# Patient Record
Sex: FEMALE | Race: WHITE | Employment: OTHER | URBAN - METROPOLITAN AREA
[De-identification: names, ages, dates, MRNs, and addresses within clinical notes are randomized per-mention and may not be internally consistent; named-entity substitution may affect disease eponyms.]

---

## 2018-08-31 ENCOUNTER — HOSPITAL ENCOUNTER (OUTPATIENT)
Dept: OTHER | Age: 68
Discharge: HOME | End: 2018-09-07
Attending: PHYSICAL MEDICINE & REHABILITATION | Admitting: PHYSICAL MEDICINE & REHABILITATION

## 2018-08-31 ENCOUNTER — HOSPITAL ENCOUNTER (OUTPATIENT)
Dept: PHYSICAL THERAPY | Age: 68
Discharge: HOME | End: 2018-08-31
Admitting: PHYSICAL MEDICINE & REHABILITATION

## 2018-08-31 NOTE — PROGRESS NOTES
impaired, limited or restricted  Changing and Maintaining Body Position Goal Status (): At least 1 percent but less than 20 percent impaired, limited or restricted    OutComes Score  Neck Disability Index Raw Score: 12                                        Goals  Short term goals  Time Frame for Short term goals: 3 weeks  Short term goal 1: Be independent with home exer  Short term goal 2: Decrease pain 25-50%  Long term goals  Time Frame for Long term goals : 6 weeks  Long term goal 1: Decrease pain 50-75%  Long term goal 2: Iprove cervical ROM to be able to maintain good posture  Patient Goals   Patient goals : \"Stop hurting\"          Casper Goldberg [id number]     .

## 2018-09-01 ENCOUNTER — HOSPITAL ENCOUNTER (OUTPATIENT)
Dept: OTHER | Age: 68
Discharge: HOME | End: 2018-09-01
Attending: PHYSICAL MEDICINE & REHABILITATION | Admitting: PHYSICAL MEDICINE & REHABILITATION
Payer: MEDICARE

## 2018-09-04 ENCOUNTER — HOSPITAL ENCOUNTER (OUTPATIENT)
Dept: PHYSICAL THERAPY | Age: 68
Discharge: HOME | End: 2018-09-05
Admitting: PHYSICAL MEDICINE & REHABILITATION

## 2018-09-04 NOTE — FLOWSHEET NOTE
Physical Therapy Aquatic Flow Sheet   Date:  2018    Patient Name:  Palak Baker    :  1950    Restrictions/Precautions:    Pertinent Medical History:  Back pain, RA, Parkinson, Scoliosis,    Diagnosis:  Cervical Spondylosis  Treatment Diagnosis:      Insurance/Certification information: medicare  Referring Physician:  Dr Shae Posada of care signed (Y/N):      Visit# / total visits:    Pain level: 7/10       G-Code (if applicable):      Date G-Code Applied:        Progress Note: []  Yes  [x]  No  Next due by: Visit #10:      Subjective:  My left shoulder hurts more than my neck. I don't have any neck pain. Objective:  Observation:  See eval  Test measurements:      Key  B= Belt DB= Dumbells T= Theratube   G=Gloves N= Noodles W= Weights   P= Paddles WW=Water Walker K= Kickboard        Transfers:   steps with bilat handrails      % Immersion: 75%            Ambulation:   Exercises UE:      Forwards 3+2 with UE circles Shoulder Shrugs 10    Lateral 1 with UE ab/ad Shoulder circles 10    Marching    Scapular Retraction  10    Retro   Rolling  10    Cariocas  Push Downs 10     IR/ER 10     Punching    Stretching:  Rowing 10   Gastroc/Soleus   Elbow Flex/Ext 10   Hamstring   Shldr Flex/Ext     Knee flex stretch   Shldr Abd/Add    Piriformis   Horiz Abd/Add     Hip flexor    Arm Circles  10 x 2   SKTC   PNF Diagonals    DKTC  UE oscillations f/b, s/s    ITB   Wall Push-ups    Quad  Figure 8's    Mid back  20 sec x 2 Buoy pull/push downs    UT  Tband rows    Rhom  Tband lats    Post Shoulder  Lumbar Rot w/ paddles    Pec   Small ball pull downs                   diagonals             Cervical:       AROM Flex 10      AROM Extension     LEs exercises:   AROM Side Bending    Marching   AROM Rotation 10 R/L   Heel Raises   Chin tucks    Toe Raises   Chin nods     Squats        Hamstring Curls        Hip Flexion   Balance:      Hip Abduction  SLS    Hip Circles  Tandem stance    TA set [address]

## 2018-09-06 ENCOUNTER — HOSPITAL ENCOUNTER (OUTPATIENT)
Dept: PHYSICAL THERAPY | Age: 68
Discharge: HOME | End: 2018-09-07
Admitting: PHYSICAL MEDICINE & REHABILITATION

## 2018-09-06 NOTE — FLOWSHEET NOTE
Tandem stance    TA set  NBOS eyes open    Glut Set  NBOS eyes closed    Hip Extension  Hand to Opposite Knee    Hip Adduction    Box Step     Hip IR   Noodle Stance     Hip ER  Stop/Go Gait    Fig 8's  Switch Gait                Seated:  Functional:    Ankle Pumps  Step up forward    Ankle circles  Step up lateral    Knee flex & ext  Step down    Hip Abd & Adduction  [place] squats    Bicycle   Crate Lifts    Add Set with ball  Lunges forward    LX stab with med ball throws  Lunges lateral    Ankle INV  Lunges retro    Ankle EV  Lower ab curl with noodle      Upper ab curl with ball      Med ball straight lifts      Med ball diagonal lifts      Hydrorider          Noodle:      Leg Press  Deep Water:    Noodle hang at wall  Jog    Noodle hang deep water  Jumping Jacks    Noodle Bicycle  Heel to toe      Hand to opposite knee      Cross country skier      Rocking Horse    Verbal cues for proper posture, exercise technique and exercise without increase pain. Timed Code Treatment Minutes:  25    Total Treatment Minutes:  25    Treatment/Activity Tolerance:  [x] Patient tolerated treatment well [] Patient limited by fatique  [] Patient limited by pain  [] Patient limited by other medical complications  [] Other:     Pain after aquatics: 5-6/10    Patient Education: Continued to suggest to pt watch posture when sitting regardless of which chair at home. Pt verbalized understanding and stated she is getting a new chair in 6-8 weeks. Plan:   [x] Continue per plan of care [] Alter current plan (see comments)  [] Plan of care initiated [] Hold pending MD visit [] Discharge    Plan for Next Session:  Gradually increase gait and exercise with an emphasis on posture, proper exercise tech, and no increase pain. Therapist will educate patient on lumbopelvic stabilization with exercise and ADL's. Add:  Inc lateral gt, small ball push downs as tolerated.     Electronically signed by: Mariposa Moon PTA

## 2018-09-25 ENCOUNTER — HOSPITAL ENCOUNTER (OUTPATIENT)
Dept: PHYSICAL THERAPY | Age: 68
Setting detail: THERAPIES SERIES
Discharge: HOME | End: 2018-09-25
Payer: MEDICARE

## 2018-09-25 PROCEDURE — 97530 THERAPEUTIC ACTIVITIES: CPT | Performed by: CHIROPRACTOR

## 2018-09-25 PROCEDURE — G8982 BODY POS GOAL STATUS: HCPCS | Performed by: CHIROPRACTOR

## 2018-09-25 PROCEDURE — 97113 AQUATIC THERAPY/EXERCISES: CPT

## 2018-09-25 PROCEDURE — G8983 BODY POS D/C STATUS: HCPCS | Performed by: CHIROPRACTOR

## 2018-09-25 PROCEDURE — 97150 GROUP THERAPEUTIC PROCEDURES: CPT

## 2018-09-25 NOTE — FLOWSHEET NOTE
the following for HEP:   Patient verbalized/demonstrated understanding and was issued written handout.     Manual Treatments:      Modalities:      Timed Code Treatment Minutes:  25    Total Treatment Minutes: 25     Treatment/Activity Tolerance:  [x] Patient tolerated treatment well [] Patient limited by fatigue  [] Patient limited by pain  [] Patient limited by other medical complications  [] Other:     Prognosis: [x] Good [] Fair  [] Poor    Goals:    Short term goals  Time Frame for Short term goals: 3 weeks  Short term goal 1: Be independent with home exer  Short term goal 2: Decrease pain 25-50%      Long term goals  Time Frame for Long term goals : 6 weeks  Long term goal 1: Decrease pain 50-75%  Long term goal 2: Improve cervical ROM to be able to maintain good posture     Patient Requires Follow-up: [] Yes  [x] No    Plan:   [] Continue per plan of care [] Alter current plan (see comments)  [] Plan of care initiated [] Hold pending MD visit [x] Discharge    Plan for Next Session:  DC to HEP    Electronically signed by:  Roseanne Woodruff PT #[id number]

## 2018-09-25 NOTE — DISCHARGE SUMMARY
Physical Therapy Discharge Note  Date: 2018    Patient Name: Nicky Carrion  : 1950  MRN: [id number]    Diagnosis:  Cervical Spondylosis    Referring Physician: Dr. Kodi Dickerson  Dates of Service: 18 - 18  Total # of Visits:  7    Medicare Total:     $933.15      G-code (if applicable):    Date G-code Applied:  PT G-Codes  Functional Assessment Tool Used: NDI  Score: 23  Functional Limitation: Changing and maintaining body position  Changing and Maintaining Body Position Goal Status (): At least 1 percent but less than 20 percent impaired, limited or restricted  Changing and Maintaining Body Position Discharge Status (): At least 40 percent but less than 60 percent impaired, limited or restricted    Treatment to Date:  [] Therapeutic Exercise  [] Modalities:  [x] Therapeutic Activity     [] Ultrasound  [] Electrical Stim   [] Gait Training      [] Cervical Traction    [] Lumbar Traction  [] Neuromuscular Re-education  [] Cold/hotpack [] Iontophoresis  [] Instruction in HEP      Other:  [] Manual Therapy       []    [x] Aquatic Therapy       []                    ? Significant Findings At Last Visit:       No improvement of pain or ROM            Goal Status:  [] Achieved [] Partially Achieved  [x] Not Achieved     Reason for Discharge:  [] Goals Met   [] Patient did not return after initial evaluation   [] Progress Plateaued [] Missed _____ scheduled appointments   [] No insurance coverage [x] Patient terminated therapy   [] Other:         PT recommendation:   [x] Patient now discharged with HEP      [] Other:    Discharge discussed with:  [x] Patient  [] Caregiver      [] Other        Electronically signed by:  Elizabeth Bradley #[id number]    If you have any questions or concerns, please don't hesitate to call.   Thank you for your referral.

## 2019-03-25 ENCOUNTER — HOSPITAL ENCOUNTER (OUTPATIENT)
Dept: PHYSICAL THERAPY | Age: 69
Setting detail: THERAPIES SERIES
Discharge: HOME | End: 2019-03-25
Payer: MEDICARE

## 2019-03-25 NOTE — PROGRESS NOTES
Physical Therapy  Cancellation/No-show Note  Patient Name:  Jones Thrasher  :  1950   Date:  3/25/2019  Cancelled visits to date: 1  No-shows to date: 0    For today's appointment patient:  [x]  Cancelled - initial evaluation   []  Rescheduled appointment  []  No-show     Reason given by patient:  []  Patient ill  []  Conflicting appointment  []  No transportation    []  Conflict with work  []  No reason given  [x]  Other:     Comments: injured foot      Electronically signed by:  Guy Russo, [id number] [address]

## 2019-03-26 ENCOUNTER — APPOINTMENT (OUTPATIENT)
Dept: PHYSICAL THERAPY | Age: 69
End: 2019-03-26
Payer: MEDICARE

## 2019-03-28 ENCOUNTER — HOSPITAL ENCOUNTER (OUTPATIENT)
Dept: PHYSICAL THERAPY | Age: 69
Setting detail: THERAPIES SERIES
Discharge: HOME | End: 2019-03-28
Payer: MEDICARE

## 2019-03-28 ENCOUNTER — APPOINTMENT (OUTPATIENT)
Dept: PHYSICAL THERAPY | Age: 69
End: 2019-03-28
Payer: MEDICARE

## 2019-03-28 PROCEDURE — 97530 THERAPEUTIC ACTIVITIES: CPT | Performed by: CHIROPRACTOR

## 2019-03-28 PROCEDURE — 97161 PT EVAL LOW COMPLEX 20 MIN: CPT | Performed by: CHIROPRACTOR

## 2019-04-02 ENCOUNTER — APPOINTMENT (OUTPATIENT)
Dept: PHYSICAL THERAPY | Age: 69
End: 2019-04-02
Payer: MEDICARE

## 2019-04-02 ENCOUNTER — HOSPITAL ENCOUNTER (OUTPATIENT)
Dept: PHYSICAL THERAPY | Age: 69
Setting detail: THERAPIES SERIES
Discharge: HOME | End: 2019-04-02
Payer: MEDICARE

## 2019-04-02 PROCEDURE — 97113 AQUATIC THERAPY/EXERCISES: CPT

## 2019-04-02 NOTE — FLOWSHEET NOTE
Curls  10      Hip Flexion  10 Balance: Hip Abduction  10 SLS    Hip Circles  10 Tandem stance    TA set   NBOS eyes open    Glut Set  10 NBOS eyes closed    Hip Extension   Hand to Opposite Knee    Hip Adduction    Box Step     Hip IR   Noodle Stance     Hip ER  Stop/Go Gait    Fig 8's  Switch Gait                Seated:  Functional:    Ankle Pumps  10 Step up forward    Ankle circles  10 Step up lateral    Knee flex & ext  10 Step down    Hip Abd & Adduction  10 [place] squats    Bicycle   10 Crate Lifts    Add Set with ball  Lunges forward    LX stab with med ball throws  Lunges lateral    Ankle INV  Lunges retro    Ankle EV  Lower ab curl with noodle      Upper ab curl with ball      Med ball straight lifts      Med ball diagonal lifts      Hydrorider          Noodle:      Leg Press  Deep Water:    Noodle hang at wall  Jog    Noodle hang deep water  Jumping Jacks    Noodle Bicycle  Heel to toe      Hand to opposite knee      Cross country skier      Rocking Horse        Timed Code Treatment Minutes:  30    Total Treatment Minutes:  30    Treatment/Activity Tolerance:  [x] Patient tolerated treatment well [] Patient limited by fatique  [] Patient limited by pain  [] Patient limited by other medical complications  [] Other:     Prognosis: [] Good [] Fair  [] Poor    Patient Requires Follow-up: [] Yes  [] No    Plan:   [x] Continue per plan of care [] Alter current plan (see comments)  [] Plan of care initiated [] Hold pending MD visit [] Discharge    Plan for Next Session:  With emphasis on stabilization, good posture and exercise technique, gradually progress spinal stabilization exercises to increase strength, flexibility and endurance and to decrease pain and improve function.   Add: Assess and progress as tolerated      Electronically signed by: Bella Loza PTA

## 2019-04-04 ENCOUNTER — APPOINTMENT (OUTPATIENT)
Dept: PHYSICAL THERAPY | Age: 69
End: 2019-04-04
Payer: MEDICARE

## 2019-04-09 ENCOUNTER — APPOINTMENT (OUTPATIENT)
Dept: PHYSICAL THERAPY | Age: 69
End: 2019-04-09
Payer: MEDICARE

## 2019-04-09 ENCOUNTER — HOSPITAL ENCOUNTER (OUTPATIENT)
Dept: PHYSICAL THERAPY | Age: 69
Setting detail: THERAPIES SERIES
Discharge: HOME | End: 2019-04-09
Payer: MEDICARE

## 2019-04-09 PROCEDURE — 97150 GROUP THERAPEUTIC PROCEDURES: CPT

## 2019-04-09 PROCEDURE — 97113 AQUATIC THERAPY/EXERCISES: CPT

## 2019-04-09 NOTE — FLOWSHEET NOTE
Physical Therapy Aquatic Flow Sheet   Date:  2019    Patient Name:  Francesco Koenig    :  1950    Restrictions/Precautions:    Pertinent Medical History:     Diagnosis:  Cervical stenosis, adhesive capsulitis, chronic musculoskeletal pain  Treatment Diagnosis:      Insurance/Certification information: Medicare  Referring Physician: Dr Anai Roberts of care signed (Y/N):      Visit# / total visits:  3/12  Pain level: 4/10 Left shoulder and 4/10 LB      Progress Note: []  Yes  [x]  No  Next due by: Visit #10:      History of Injury:  States she had similar problem in the past which improved, but then began increasing a few months ago    Subjective:  My back is also hurting. No increase pain after initial aquatics.   Objective:  Observation:  See eval  Test measurements:      Key  B= Belt DB= Dumbells T= Theratube   G=Gloves N= Noodles W= Weights   P= Paddles WW=Water Walker K= Kickboard     *[facility] use pool at [facility]*   Transfers:   steps  (ind)      % Immersion:  75            Ambulation:  Laps Exercises UE:      Forwards  3 +1  Shoulder Shrugs     Lateral 1 Shoulder circles  10    Marching    Scapular Retraction  10    Retro   Rolling  10    Cariocas  Push Downs  10     IR/ER  10     Punching     Stretching: bilat Rowing  10   Gastroc/Soleus   Elbow Flex/Ext  10   Hamstring  20 sec x 3 Shldr Flex/Ext     Knee flex stretch   Shldr Abd/Add    Piriformis   Horiz Abd/Add     Hip flexor    Arm Circles  10 x 2   SKTC   PNF Diagonals    DKTC  UE oscillations f/b, s/s    ITB   Wall Push-ups    Quad  Figure 8's    Mid back   Buoy pull/push downs    UT  Tband rows    Rhom  Tband lats    Post Shoulder  Lumbar Rot w/ paddles    Pec   Small ball pull downs                   diagonals             Cervical:       AROM Flex       AROM Extension     LEs exercises:   AROM Side Bending    Marching  10 AROM Rotation    Heel Raises  HOLD Chin tucks    Toe Raises  HOLD Chin nods     Squats  10 Hamstring Curls  10      Hip Flexion  10 Balance: Hip Abduction  10 SLS    Hip Circles  10 Tandem stance    TA set   NBOS eyes open    Glut Set  10 NBOS eyes closed    Hip Extension   Hand to Opposite Knee    Hip Adduction    Box Step     Hip IR   Noodle Stance     Hip ER  Stop/Go Gait    Fig 8's  Switch Gait                Seated:  Functional:    Ankle Pumps  15 Step up forward    Ankle circles  10 Step up lateral    Knee flex & ext  15 Step down    Hip Abd & Adduction  15 [place] squats    Bicycle   15 Crate Lifts    Add Set with ball 10 Lunges forward    LX stab with med ball throws  Lunges lateral    Ankle INV  Lunges retro    Ankle EV  Lower ab curl with noodle      Upper ab curl with ball      Med ball straight lifts      Med ball diagonal lifts      Foot onto/off of step alt 10         Noodle:      Leg Press  Deep Water:    Noodle hang at wall  Jog    Noodle hang deep water  Jumping Jacks    Noodle Bicycle  Heel to toe      Hand to opposite knee      Cross country skier      Rocking Horse    Verbal cues for proper posture, exercise technique and exercise without increase pain. Timed Code Treatment Minutes:  35  Aquatic group therapy is the presence of more than 1 patient in the pool, at the same time. During that time each patient receives individualized instruction designed for their specific diagnosis. Total Treatment Minutes:  35    Treatment/Activity Tolerance:  [x] Patient tolerated treatment well [] Patient limited by fatique  [] Patient limited by pain  [] Patient limited by other medical complications  [] Other:     Pain after aquatics:   same    Patient Education:  Discussed with pt importance of exercises without increase pain and with proper posture. Pt verbalized understanding.     Plan:   [x] Continue per plan of care [] Alter current plan (see comments)  [] Plan of care initiated [] Hold pending MD visit [] Discharge    Plan for Next Session:    Add: inc forward gt x 4, inc lateral gt x 2, inc seated exer x 20, leg press as tolerated.       Electronically signed by: Ayah Coley, PTA  [id number]

## 2019-04-11 ENCOUNTER — APPOINTMENT (OUTPATIENT)
Dept: PHYSICAL THERAPY | Age: 69
End: 2019-04-11
Payer: MEDICARE

## 2019-04-11 ENCOUNTER — HOSPITAL ENCOUNTER (OUTPATIENT)
Dept: PHYSICAL THERAPY | Age: 69
Setting detail: THERAPIES SERIES
Discharge: HOME | End: 2019-04-11
Payer: MEDICARE

## 2019-04-11 PROCEDURE — 97150 GROUP THERAPEUTIC PROCEDURES: CPT

## 2019-04-11 PROCEDURE — 97113 AQUATIC THERAPY/EXERCISES: CPT

## 2019-04-11 NOTE — FLOWSHEET NOTE
Physical Therapy Aquatic Flow Sheet   Date:  2019    Patient Name:  Arden Palencia    :  1950    Restrictions/Precautions:    Pertinent Medical History:     Diagnosis:  Cervical stenosis, adhesive capsulitis, chronic musculoskeletal pain  Treatment Diagnosis:      Insurance/Certification information: Medicare  Referring Physician: Dr Ki Vega of care signed (Y/N):      Visit# / total visits:    Pain level: 4/10 Left shoulder and 5/10 LB, no neck pain      Progress Note: []  Yes  [x]  No  Next due by: Visit #10:      History of Injury:  States she had similar problem in the past which improved, but then began increasing a few months ago    Subjective:  No more pain with the pool. I do expect my back to hurt more later.   Objective:  Observation:  See eval  Test measurements:      Key  B= Belt DB= Dumbells T= Theratube   G=Gloves N= Noodles W= Weights   P= Paddles WW=Water Walker K= Kickboard     *Can use pool at [facility]*   Transfers:   steps  (ind)      % Immersion:  75            Ambulation:  Laps Exercises UE:      Forwards  4 +1  Shoulder Shrugs     Lateral 2 Shoulder circles  10    Marching    Scapular Retraction  10    Retro   Rolling  10    Cariocas  Push Downs  10     IR/ER  10     Punching  10   Stretching: bilat Rowing  10   Gastroc/Soleus   Elbow Flex/Ext  10   Hamstring  20 sec x 3 Shldr Flex/Ext  10   Knee flex stretch   Shldr Abd/Add 10   Piriformis   Horiz Abd/Add     Hip flexor    Arm Circles  10 x 2   SKTC   PNF Diagonals    DKTC  UE oscillations f/b, s/s    ITB   Wall Push-ups    Quad  Figure 8's    Mid back   Buoy pull/push downs    UT  Tband rows    Rhom  Tband lats    Post Shoulder  Lumbar Rot w/ paddles    Pec   Small ball pull downs                   diagonals             Cervical:       AROM Flex       AROM Extension     LEs exercises:   AROM Side Bending    Marching  10 AROM Rotation    Heel Raises  HOLD Chin tucks    Toe Raises  HOLD Chin nods Squats  10      Hamstring Curls  10      Hip Flexion  10 Balance: Hip Abduction  10 SLS    Hip Circles  10 Tandem stance    TA set   NBOS eyes open    Glut Set  10 NBOS eyes closed    Hip Extension   Hand to Opposite Knee    Hip Adduction    Box Step     Hip IR   Noodle Stance     Hip ER  Stop/Go Gait    Fig 8's  Switch Gait                Seated:  Functional:    Ankle Pumps  20 Step up forward    Ankle circles  10 Step up lateral    Knee flex & ext  20 Step down    Hip Abd & Adduction  20 [place] squats    Bicycle   20 Crate Lifts    Add Set with ball 10 Lunges forward    LX stab with med ball throws  Lunges lateral    Ankle INV  Lunges retro    Ankle EV  Lower ab curl with noodle      Upper ab curl with ball      Med ball straight lifts      Med ball diagonal lifts      Foot onto/off of step alt 15         Noodle:      Leg Press 10 R/L Deep Water:    Noodle hang at wall  Jog    Noodle hang deep water  Jumping Jacks    Noodle Bicycle  Heel to toe      Hand to opposite knee      Cross country skier      Rocking Horse    Verbal cues for proper posture, exercise technique and exercise without increase pain. Timed Code Treatment Minutes:  35  Aquatic group therapy is the presence of more than 1 patient in the pool, at the same time. During that time each patient receives individualized instruction designed for their specific diagnosis. Total Treatment Minutes:  35    Treatment/Activity Tolerance:  [x] Patient tolerated treatment well [] Patient limited by fatique  [] Patient limited by pain  [] Patient limited by other medical complications  [] Other:     Pain after aquatics:   5.5/10 LB, shld 4/10    Patient Education:  Discussed with pt in length use of ice to specific spot on left low back that pt c/o of. Suggested pt using ice @10-15 min several times today, especially before the pain gets worse. Pt verbalized understanding.   Plan:   [x] Continue per plan of care [] Alter current plan (see comments)  [] Plan of care initiated [] Hold pending MD visit [] Discharge    Plan for Next Session:    Add: inc seated exer x 30, leg press x 15 each as tolerated. Assess use of ice.       Electronically signed by: Zhanna Bowman, PTA  [id number]

## 2019-04-16 ENCOUNTER — HOSPITAL ENCOUNTER (OUTPATIENT)
Dept: PHYSICAL THERAPY | Age: 69
Setting detail: THERAPIES SERIES
Discharge: HOME | End: 2019-04-16
Payer: MEDICARE

## 2019-04-16 ENCOUNTER — APPOINTMENT (OUTPATIENT)
Dept: PHYSICAL THERAPY | Age: 69
End: 2019-04-16
Payer: MEDICARE

## 2019-04-16 PROCEDURE — 97113 AQUATIC THERAPY/EXERCISES: CPT

## 2019-04-16 PROCEDURE — 97150 GROUP THERAPEUTIC PROCEDURES: CPT

## 2019-04-16 NOTE — FLOWSHEET NOTE
Physical Therapy Aquatic Flow Sheet   Date:  2019    Patient Name:  Cole Arzola    :  1950    Restrictions/Precautions:    Pertinent Medical History:     Diagnosis:  Cervical stenosis, adhesive capsulitis, chronic musculoskeletal pain  Treatment Diagnosis:      Insurance/Certification information: Medicare  Referring Physician: Dr Elisabet Nunez of care signed (Y/N):      Visit# / total visits:    Pain level: 5/10 LB     Progress Note: []  Yes  [x]  No  Next due by: Visit #10:      History of Injury:  States she had similar problem in the past which improved, but then began increasing a few months ago    Subjective: My whole body is stiff today. My left part of my back is painful (pt pointing to left superior lumbar spine). My left shoulder isn't hurting.      Objective:  Observation:  See eval  Test measurements:      Key  B= Belt DB= Dumbells T= Theratube   G=Gloves N= Noodles W= Weights   P= Paddles WW=Water Walker K= Kickboard     *Can use pool at [facility]/[employer]*   Transfers:   steps  (ind)      % Immersion:  75            Ambulation:  Laps Exercises UE:      Forwards  4 +1  Shoulder Shrugs     Lateral 2 Shoulder circles  10    Marching    Scapular Retraction  10    Retro   Rolling  10    Cariocas  Push Downs  10     IR/ER  10     Punching  10   Stretching: bilat Rowing  10   Gastroc/Soleus   Elbow Flex/Ext  10   Hamstring  20 sec x 3 Shldr Flex/Ext  10   Knee flex stretch   Shldr Abd/Add 10   Piriformis   Horiz Abd/Add     Hip flexor    Arm Circles  10 x 2   SKTC   PNF Diagonals    DKTC  UE oscillations f/b, s/s    ITB   Wall Push-ups    Quad  Figure 8's    Mid back   Buoy pull/push downs    UT  Tband rows    Rhom  Tband lats    Post Shoulder  Lumbar Rot w/ paddles    Pec   Small ball pull downs                   diagonals             Cervical:       AROM Flex 5      AROM Extension     LEs exercises:   AROM Side Bending    Marching  10 AROM Rotation 5R/ 5L   Heel Raises  HOLD Chin tucks    Toe Raises  HOLD Chin nods     Squats  10      Hamstring Curls  10      Hip Flexion  10 Balance: Hip Abduction  10 SLS    Hip Circles  10 Tandem stance    TA set   NBOS eyes open    Glut Set  10 NBOS eyes closed    Hip Extension   Hand to Opposite Knee    Hip Adduction    Box Step     Hip IR   Noodle Stance     Hip ER  Stop/Go Gait    Fig 8's  Switch Gait                Seated:  Functional:    Ankle Pumps  30 Step up forward    Ankle circles  10 Step up lateral    Knee flex & ext  30 Step down    Hip Abd & Adduction  30 [place] squats    Bicycle   30 Crate Lifts    Add Set with ball 10 Lunges forward    LX stab with med ball throws  Lunges lateral    Ankle INV  Lunges retro    Ankle EV  Lower ab curl with noodle      Upper ab curl with ball      Med ball straight lifts      Med ball diagonal lifts      Foot onto/off of step alt 20         Noodle:      Leg Press 15 R/L Deep Water:    Noodle hang at wall 5 min with dec pain noted Jog    Noodle hang deep water  Jumping Jacks    Noodle Bicycle  Heel to toe      Hand to opposite knee      Cross country skier      Rocking Horse    Verbal cues for proper posture, exercise technique and exercise without increase pain. Timed Code Treatment Minutes:  45  Aquatic group therapy is the presence of more than 1 patient in the pool, at the same time. During that time each patient receives individualized instruction designed for their specific diagnosis.       Total Treatment Minutes:  45    Treatment/Activity Tolerance:  [x] Patient tolerated treatment well [] Patient limited by fatique  [] Patient limited by pain  [] Patient limited by other medical complications  [] Other:     Pain after aquatics:   4-5/10 LB,     Patient Education:      Plan:   [x] Continue per plan of care [] Alter current plan (see comments)  [] Plan of care initiated [] Hold pending MD visit [] Discharge    Plan for Next Session:    Add: leg press x 20 each, continue with [employer] for dec pain as tolerated.         Electronically signed by: Aristides Brown, PTA  [id number]

## 2019-04-18 ENCOUNTER — APPOINTMENT (OUTPATIENT)
Dept: PHYSICAL THERAPY | Age: 69
End: 2019-04-18
Payer: MEDICARE

## 2019-04-23 ENCOUNTER — APPOINTMENT (OUTPATIENT)
Dept: PHYSICAL THERAPY | Age: 69
End: 2019-04-23
Payer: MEDICARE

## 2019-04-23 ENCOUNTER — HOSPITAL ENCOUNTER (OUTPATIENT)
Dept: PHYSICAL THERAPY | Age: 69
Setting detail: THERAPIES SERIES
Discharge: HOME | End: 2019-04-23
Payer: MEDICARE

## 2019-04-23 PROCEDURE — 97150 GROUP THERAPEUTIC PROCEDURES: CPT

## 2019-04-23 PROCEDURE — 97113 AQUATIC THERAPY/EXERCISES: CPT

## 2019-04-23 NOTE — FLOWSHEET NOTE
Physical Therapy Aquatic Flow Sheet   Date:  2019    Patient Name:  Cole Arzola    :  1950    Restrictions/Precautions:    Pertinent Medical History:     Diagnosis:  Cervical stenosis, adhesive capsulitis, chronic musculoskeletal pain  Treatment Diagnosis:      Insurance/Certification information: Medicare  Referring Physician: Dr Elisabet Nunez of care signed (Y/N):      Visit# / total visits:    Pain level: 3/10 LB, 5/10 left shoulder     Progress Note: []  Yes  [x]  No  Next due by: Visit #10:      History of Injury:  States she had similar problem in the past which improved, but then began increasing a few months ago    Subjective:  I am going to see my doctor next week to see what is going on with my back.      Objective:  Observation:  See eval  Test measurements:      Key  B= Belt DB= Dumbells T= Theratube   G=Gloves N= Noodles W= Weights   P= Paddles WW=Water Walker K= Kickboard     *Can use pool at [facility]*   Transfers:   steps  (ind)      % Immersion:  75            Ambulation:  Laps Exercises UE:      Forwards  4 +1  Shoulder Shrugs     Lateral 2 Shoulder circles  10    Marching    Scapular Retraction  10    Retro   Rolling  10    Cariocas  Push Downs  10     IR/ER  10     Punching  10   Stretching: bilat Rowing  10   Gastroc/Soleus   Elbow Flex/Ext  10   Hamstring  20 sec x 3 Shldr Flex/Ext  10   Knee flex stretch   Shldr Abd/Add 10   Piriformis   Horiz Abd/Add  10   Hip flexor    Arm Circles  10 x 2   SKTC   PNF Diagonals    DKTC  UE oscillations f/b, s/s    ITB   Wall Push-ups    Quad  Figure 8's    Mid back   Buoy pull/push downs    UT  Tband rows    Rhom  Tband lats    Post Shoulder  Lumbar Rot w/ paddles    Pec   Small ball pull downs                   diagonals             Cervical:       AROM Flex 5      AROM Extension     LEs exercises:   AROM Side Bending    Marching  10 AROM Rotation 5R/ 5L   Heel Raises  HOLD Chin tucks    Toe Raises  HOLD Chin nods Squats  10      Hamstring Curls  10      Hip Flexion  10 Balance: Hip Abduction  10 SLS    Hip Circles  10 Tandem stance    TA set   NBOS eyes open    Glut Set  10 NBOS eyes closed    Hip Extension   Hand to Opposite Knee    Hip Adduction    Box Step     Hip IR   Noodle Stance     Hip ER  Stop/Go Gait    Fig 8's  Switch Gait                Seated:  Functional:    Ankle Pumps  30 Step up forward    Ankle circles  10 Step up lateral    Knee flex & ext  30 Step down    Hip Abd & Adduction  30 [place] squats    Bicycle   30 Crate Lifts    Add Set with ball 10 Lunges forward    LX stab with med ball throws  Lunges lateral    Ankle INV  Lunges retro    Ankle EV  Lower ab curl with noodle      Upper ab curl with ball      Med ball straight lifts      Med ball diagonal lifts      Foot onto/off of step alt 20         Noodle:      Leg Press 20 R/L Deep Water:    Noodle hang at wall  Jog    Noodle hang deep water  Jumping Jacks    Noodle Bicycle  Heel to toe      Hand to opposite knee      Cross country skier      Rocking Horse    Verbal cues for proper posture, exercise technique and exercise without increase pain. Group Treatment Minutes:  20  Aquatic group therapy is the presence of more than 1 patient in the pool, at the same time. During that time each patient receives individualized instruction designed for their specific diagnosis. Total Individual Treatment Minutes:  20    Treatment/Activity Tolerance:  [x] Patient tolerated treatment well [] Patient limited by fatique  [] Patient limited by pain  [] Patient limited by other medical complications  [] Other:     Pain after aquatics:   5/10 LB and left shoulder    Patient Education:      Plan:   [x] Continue per plan of care [] Alter current plan (see comments)  [] Plan of care initiated [] Hold pending MD visit [] Discharge    Plan for Next Session:    Add: box step as tolerated.         Electronically signed by: Kelly Mohr PTA [address]

## 2019-04-25 ENCOUNTER — HOSPITAL ENCOUNTER (OUTPATIENT)
Dept: PHYSICAL THERAPY | Age: 69
Setting detail: THERAPIES SERIES
Discharge: HOME | End: 2019-04-25
Payer: MEDICARE

## 2019-04-25 ENCOUNTER — APPOINTMENT (OUTPATIENT)
Dept: PHYSICAL THERAPY | Age: 69
End: 2019-04-25
Payer: MEDICARE

## 2019-04-25 PROCEDURE — 97530 THERAPEUTIC ACTIVITIES: CPT | Performed by: CHIROPRACTOR

## 2019-04-25 PROCEDURE — 97150 GROUP THERAPEUTIC PROCEDURES: CPT

## 2019-04-25 PROCEDURE — 97113 AQUATIC THERAPY/EXERCISES: CPT

## 2019-04-25 NOTE — FLOWSHEET NOTE
Physical Therapy Aquatic Flow Sheet   Date:  2019    Patient Name:  Simran Mahan    :  1950    Restrictions/Precautions:    Pertinent Medical History:     Diagnosis:  Cervical stenosis, adhesive capsulitis, chronic musculoskeletal pain  Treatment Diagnosis:      Insurance/Certification information: Medicare  Referring Physician: Dr Mo Combs of care signed (Y/N):      Visit# / total visits:    Pain level: 3/10 LB, 4/10 left shoulder     Progress Note: []  Yes  [x]  No  Next due by: Visit #10:      History of Injury:  States she had similar problem in the past which improved, but then began increasing a few months ago    Subjective:  I need to make my appt with the back doctor.    Objective:  Observation:  See eval  Test measurements:      Key  B= Belt DB= Dumbells T= Theratube   G=Gloves N= Noodles W= Weights   P= Paddles WW=Water Walker K= Kickboard     *Can use pool at [facility]*   Transfers:   steps  (ind)      % Immersion:  75            Ambulation:  Laps Exercises UE:      Forwards  4 +1  Shoulder Shrugs     Lateral 2 Shoulder circles  10    Marching    Scapular Retraction  10    Retro   Rolling  10    Cariocas  Push Downs  10     IR/ER  10     Punching  10   Stretching: bilat Rowing  10   Gastroc/Soleus   Elbow Flex/Ext  10   Hamstring  20 sec x 3 Shldr Flex/Ext  10   Knee flex stretch   Shldr Abd/Add 10   Piriformis   Horiz Abd/Add  10   Hip flexor    Arm Circles  10 x 2   SKTC   PNF Diagonals    DKTC  UE oscillations f/b, s/s    ITB   Wall Push-ups    Quad  Figure 8's    Mid back   Buoy pull/push downs    UT  Tband rows    Rhom  Tband lats    Post Shoulder  Lumbar Rot w/ paddles    Pec   Small ball pull downs                   diagonals             Cervical:       AROM Flex 5      AROM Extension     LEs exercises:   AROM Side Bending    Marching  10 AROM Rotation 5R/ 5L   Heel Raises  HOLD Chin tucks    Toe Raises  HOLD Chin nods     Squats  10      Hamstring Curls 10      Hip Flexion  10 Balance: Hip Abduction  10 SLS    Hip Circles  10 Tandem stance    TA set   NBOS eyes open    Glut Set  10 NBOS eyes closed    Hip Extension   Hand to Opposite Knee    Hip Adduction    Box Step  3 R/L   Hip IR   Noodle Stance     Hip ER  Stop/Go Gait    Fig 8's  Switch Gait                Seated:  Functional:    Ankle Pumps  30 Step up forward    Ankle circles  10 Step up lateral    Knee flex & ext  30 Step down    Hip Abd & Adduction  30 [place] squats    Bicycle   30 Crate Lifts    Add Set with ball 10 Lunges forward    LX stab with med ball throws  Lunges lateral    Ankle INV  Lunges retro    Ankle EV  Lower ab curl with noodle      Upper ab curl with ball      Med ball straight lifts      Med ball diagonal lifts      Foot onto/off of step alt 20         Noodle:      Leg Press 20 R/L Deep Water:    Noodle hang at wall  Jog    Noodle hang deep water  Jumping Jacks    Noodle Bicycle  Heel to toe      Hand to opposite knee      Cross country skier      Rocking Horse    Verbal cues for proper posture, exercise technique and exercise without increase pain. Group Treatment Minutes:  20  Aquatic group therapy is the presence of more than 1 patient in the pool, at the same time. During that time each patient receives individualized instruction designed for their specific diagnosis.       Total Individual Treatment Minutes:  20    Treatment/Activity Tolerance:  [x] Patient tolerated treatment well [] Patient limited by fatique  [] Patient limited by pain  [] Patient limited by other medical complications  [] Other:     Pain after aquatics:   Same after aquatics    Patient Education:      Plan:   [] Continue per plan of care [] Alter current plan (see comments)  [] Plan of care initiated [] Hold pending MD visit [x] Discharge    Plan for Next Session:    D/C per PT plan        Electronically signed by: Pascual Sosa, PTA  [id number]

## 2019-04-25 NOTE — DISCHARGE SUMMARY
Physical Therapy Discharge Note  Date: 2019    Patient Name: Jayleen Henderson  : 1950  MRN: [id number]    Diagnosis:  Cervical Stenosis, Adhesive Capsulitis, Chronic Musculoskeletal pain    Referring Physician: Dr. Vito Sales    Dates of Service:  3/28/19 - 19  Total # of Visits:   7    Medicare Cap Total for 2019:  $638.85    G-code (if applicable):    Date G-code Applied:  PT G-Codes  Functional Assessment Tool Used: UEFS  Score: 46.25    Treatment to Date:  [] Therapeutic Exercise  [] Modalities:  [x] Therapeutic Activity     [] Ultrasound  [] Electrical Stim   [] Gait Training      [] Cervical Traction    [] Lumbar Traction  [] Neuromuscular Re-education  [] Cold/hotpack [] Iontophoresis  [] Instruction in HEP      Other:  [] Manual Therapy       []    [x] Aquatic Therapy       []                    ? Significant Findings At Last Visit:       Pain: Shoulder 4-5/10, LB 4/10       Cervical mobility decreased 25% in Rot and flex, 50% in B SB, and 75% in ex           Goal Status:  [] Achieved [x] Partially Achieved  [] Not Achieved     Reason for Discharge:  [] Goals Met   [] Patient did not return after initial evaluation   [] Progress Plateaued [] Missed _____ scheduled appointments   [] No insurance coverage [x] Patient terminated therapy   [] Other:        PT recommendation:   [x] Patient now discharged with HEP      [] Other:    Discharge discussed with:  [x] Patient  [] Caregiver      [x] Other  --Will cont exer at [facility]        Electronically signed by:  Serafin Ty #[id number]    If you have any questions or concerns, please don't hesitate to call.   Thank you for your referral.

## 2019-04-25 NOTE — FLOWSHEET NOTE
Physical Therapy Daily Treatment Note  Date:  2019    Patient Name:  Arlice Krabbe    :  1950  MRN: [id number]  Restrictions/Precautions:    Pertinent Medical History:  Medical/Treatment Diagnosis Information:  · Diagnosis: Cervical Stenosis, Adhesive Capsulitis, Chronic Musculoskeletal pain     Insurance/Certification information:   Medicare  Physician Information:    Dr. Jazmin Naidu of care signed (Y/N): Faxed  Visit# / total visits:    Pain level:   Shoulder 4-/10, LB 4/10     G-Code (if applicable):      Date / Visit # G-Code Applied:  /       Progress Note: []  Yes  []  No  Next due by: Visit #10      History of Injury: States she had similar problem in the past which improved, but then began increasing  a few months                                ago    Subjective:   States it feels good while she is in the pool, but not having any significant carryover    Objective:  Observation:   Test measurements:   L Cervical Rotation decreased to 25% deficit    Exercises:  Exercise/Equipment Resistance/Repetitions Other comments   Re-eval     Reviewed home exer/activities, posture                                                                    Other Therapeutic Activities:      Home Exercise Program:  Voiced understanding of home activities    Manual Treatments:      Modalities:      Timed Code Treatment Minutes:  25    Total Treatment Minutes:  25    Assessment  [x] Patient tolerated treatment well [] Patient limited by fatigue  [] Patient limited by pain  [] Patient limited by other medical complications  [] Other:         Prognosis: [x] Good [] Fair  [] Poor    Patient Requires Follow-up: [] Yes  [x] No    Plan:   [x] Continue per plan of care [] Alter current plan (see comments)  [] Plan of care initiated [] Hold pending MD visit [] Discharge  Plan for Next Session: DC to HEP.  Member of [employer]    Electronically signed by:  Chadwick Doss MR#[id number]

## 2019-04-30 ENCOUNTER — APPOINTMENT (OUTPATIENT)
Dept: PHYSICAL THERAPY | Age: 69
End: 2019-04-30
Payer: MEDICARE

## 2019-07-30 ENCOUNTER — HOSPITAL ENCOUNTER (OUTPATIENT)
Dept: PHYSICAL THERAPY | Age: 69
Setting detail: THERAPIES SERIES
Discharge: HOME | End: 2019-07-30
Payer: MEDICARE

## 2019-07-30 PROCEDURE — 97530 THERAPEUTIC ACTIVITIES: CPT | Performed by: CHIROPRACTOR

## 2019-07-30 PROCEDURE — G0283 ELEC STIM OTHER THAN WOUND: HCPCS | Performed by: CHIROPRACTOR

## 2019-07-30 PROCEDURE — 97161 PT EVAL LOW COMPLEX 20 MIN: CPT | Performed by: CHIROPRACTOR

## 2019-07-30 ASSESSMENT — PAIN SCALES - QUEBEC BACK PAIN DISABILITY SCALE
LIFT AND CARRY A HEAVY SUITCASE: 5
PUT ON SOCKS OR PANYHOSE: 5
QUEBEC DISABILITY INDEX: 60-79%
WALK A FEW BLOCKS OR 300 TO 400M: 4
RUN ONE BLOCK OR 100M: 5
TOTAL SCORE: 76
STAND UP FOR 20 TO 30 MINUTES: 4
CLIMB ONE FLIGHT OF STAIRS: 4
RIDE IN A CAR: 3
BEND OVER TO CLEAN THE BATHTUB: 5
REACH UP TO HIGH SHELVES: 5
WALK SEVERAL KILOMETERS  OR MILES: 5
SLEEP THROUGH THE NIGHT: 1
MAKE YOUR BED: 3
PULL OR PUSH HEAVY DOORS: 5
TAKE FOOD OUT OF THE REFRIGERATOR: 3
SIT IN A CHAIR FOR SEVERAL HOURS: 5
GET OUT OF BED: 1
THROW A BALL: 2
TURN OVER IN BED: 2
CARRY TWO BAGS OF GROCERIES: 5
MOVE A CHAIR: 4
QUEBEC CMS MODIFIER: CL

## 2019-07-30 NOTE — PLAN OF CARE
Outpatient Physical Therapy  [x] [facility]    Phone: [phone]   Fax: [phone]   [] [facility]  Phone: [phone]              Fax: [phone]  [] [facility]   Phone: [phone]   Fax: [phone]     To: Referring Practitioner: Geena Medina CNP      Patient: Patrice Lea   : 1950   MRN: [id number]  Evaluation Date: 2019      Diagnosis Information:  · Diagnosis: Lumbar Arthritis,Lumbar Stenosis, Cervical Spondylolisthesis, Parkinsons   ·       Physical Therapy Certification/Re-Certification Form  Dear Geena Medina CNP  The following patient has been evaluated for physical therapy services and for therapy to continue, Medicare requires monthly physician review of the treatment plan. Please review the attached evaluation and/or summary of the patient's plan of care, and verify that you agree therapy should continue by signing the attached document and sending it back to our office. Plan of Care/Treatment to date:  [x] Therapeutic Exercise    [x] Modalities:  [x] Therapeutic Activity     [] Ultrasound  [] Electrical Stimulation  [] Gait Training      [] Cervical Traction [] Lumbar Traction  [] Neuromuscular Re-education    [] Cold/hotpack [] Iontophoresis   [x] Instruction in HEP     Other:  [] Manual Therapy      []             [] Aquatic Therapy      []           ? Frequency/Duration:  # Days per week: [] 1 day # Weeks: [] 1 week [] 5 weeks     [] 2 days? [] 2 weeks [x] 6 weeks     [] 3 days   [] 3 weeks [] 7 weeks     [] 4 days   [] 4 weeks [x] 8 weeks    Rehab Potential: [] Excellent [x] Good [] Fair  [] Poor       Electronically signed by:  Madalyn Lundborg [id number]      If you have any questions or concerns, please don't hesitate to call.   Thank you for your referral.      Physician Signature:________________________________Date:__________________  By signing above, therapists plan is approved by physician

## 2019-07-30 NOTE — PROGRESS NOTES
Modalities, ROM       OutComes Score  [place] Total Score: 76 (07/30/19 8607)                                                      Goals  Short term goals  Time Frame for Short term goals: 3 weeks  Short term goal 1: Be independent with home exer  Short term goal 2: Decrease pain 25-50%  Long term goals  Time Frame for Long term goals : 6 weeks  Long term goal 1: Decrease pain 50-75%  Long term goal 2: Improve trunk mobility to be better able to maintain good posture  Patient Goals   Patient goals : \"Back pain Relief\"        Lorrie Gómez MS#[id number]

## 2019-08-01 ENCOUNTER — APPOINTMENT (OUTPATIENT)
Dept: PHYSICAL THERAPY | Age: 69
End: 2019-08-01
Payer: MEDICARE

## 2019-08-06 ENCOUNTER — HOSPITAL ENCOUNTER (OUTPATIENT)
Dept: PHYSICAL THERAPY | Age: 69
Setting detail: THERAPIES SERIES
Discharge: HOME | End: 2019-08-06
Payer: MEDICARE

## 2019-08-06 PROCEDURE — 97110 THERAPEUTIC EXERCISES: CPT | Performed by: CHIROPRACTOR

## 2019-08-06 PROCEDURE — G0283 ELEC STIM OTHER THAN WOUND: HCPCS | Performed by: CHIROPRACTOR

## 2019-08-08 ENCOUNTER — HOSPITAL ENCOUNTER (OUTPATIENT)
Dept: PHYSICAL THERAPY | Age: 69
Setting detail: THERAPIES SERIES
Discharge: HOME | End: 2019-08-08
Payer: MEDICARE

## 2019-08-08 PROCEDURE — G0283 ELEC STIM OTHER THAN WOUND: HCPCS | Performed by: CHIROPRACTOR

## 2019-08-08 PROCEDURE — 97110 THERAPEUTIC EXERCISES: CPT | Performed by: CHIROPRACTOR

## 2019-08-08 PROCEDURE — 97035 APP MDLTY 1+ULTRASOUND EA 15: CPT | Performed by: CHIROPRACTOR

## 2019-08-13 ENCOUNTER — HOSPITAL ENCOUNTER (OUTPATIENT)
Dept: PHYSICAL THERAPY | Age: 69
Setting detail: THERAPIES SERIES
Discharge: HOME | End: 2019-08-13
Payer: MEDICARE

## 2019-08-13 PROCEDURE — G0283 ELEC STIM OTHER THAN WOUND: HCPCS | Performed by: CHIROPRACTOR

## 2019-08-13 PROCEDURE — 97110 THERAPEUTIC EXERCISES: CPT | Performed by: CHIROPRACTOR

## 2019-08-13 PROCEDURE — 97035 APP MDLTY 1+ULTRASOUND EA 15: CPT | Performed by: CHIROPRACTOR

## 2019-08-15 ENCOUNTER — HOSPITAL ENCOUNTER (OUTPATIENT)
Dept: PHYSICAL THERAPY | Age: 69
Setting detail: THERAPIES SERIES
Discharge: HOME | End: 2019-08-15
Payer: MEDICARE

## 2019-08-15 PROCEDURE — 97110 THERAPEUTIC EXERCISES: CPT | Performed by: CHIROPRACTOR

## 2019-08-15 PROCEDURE — G0283 ELEC STIM OTHER THAN WOUND: HCPCS | Performed by: CHIROPRACTOR

## 2019-08-20 ENCOUNTER — HOSPITAL ENCOUNTER (OUTPATIENT)
Dept: PHYSICAL THERAPY | Age: 69
Setting detail: THERAPIES SERIES
Discharge: HOME | End: 2019-08-20
Payer: MEDICARE

## 2019-08-20 PROCEDURE — 97110 THERAPEUTIC EXERCISES: CPT | Performed by: CHIROPRACTOR

## 2019-08-20 PROCEDURE — G0283 ELEC STIM OTHER THAN WOUND: HCPCS | Performed by: CHIROPRACTOR

## 2019-08-22 ENCOUNTER — HOSPITAL ENCOUNTER (OUTPATIENT)
Dept: PHYSICAL THERAPY | Age: 69
Setting detail: THERAPIES SERIES
Discharge: HOME | End: 2019-08-22
Payer: MEDICARE

## 2019-08-22 PROCEDURE — G0283 ELEC STIM OTHER THAN WOUND: HCPCS | Performed by: CHIROPRACTOR

## 2019-08-22 PROCEDURE — 97110 THERAPEUTIC EXERCISES: CPT | Performed by: CHIROPRACTOR

## 2019-08-22 NOTE — FLOWSHEET NOTE
Physical Therapy Daily Treatment Note  Date:  2019    Patient Name:  Omari Martinez    :  1950  MRN: [id number]  Restrictions/Precautions:    Pertinent Medical History:  Medical/Treatment Diagnosis Information:  · Diagnosis: Lumbar Arthritis,Lumbar Stenosis, Cervical Spondylolisthesis, Parkinsons     Insurance/Certification information:   Medicare  Physician Information:  Referring Practitioner: Erasmo Laguerre CNP  Plan of care signed (Y/N):   Faxed  Visit# / total visits:    Pain level: 7/10      History of Injury:  States LBP began ~ 4 months ago for no apparent reason    Subjective:   Attributes increased pain to increased activity (going to the store)    Objective:  Observation:   Test measurements:      Exercises:  Exercise/Equipment Resistance/Repetitions Other comments        Reviewed home exer, posture and body mechanics          Nu-step                #5 X 5 min    GSS/HSS 30 sec x 2    Stand abd 2# - 2x10      R/L         Seated reclines 2x10          LTR x10    SLR 1# - 2x10    R/L     Bridges    2x10              Gentle B LE Tx 20 sec x 6                 E-stim    (sit) IFC with MH x 15 min                Other Therapeutic Activities:      Home Exercise Program:  Voiced understanding of home exer    Manual Treatments:      Modalities:  E-stim, MH    Timed Code Treatment Minutes:  30    Total Treatment Minutes:  45    Assessment  [x] Patient tolerated treatment well [] Patient limited by fatigue  [] Patient limited by pain  [] Patient limited by other medical complications  [] Other:       Prognosis: [x] Good [] Fair  [] Poor    Patient Requires Follow-up: [x] Yes  [] No    Plan:   [x] Continue per plan of care [] Alter current plan (see comments)  [] Plan of care initiated [] Hold pending MD visit [] Discharge  Plan for Next Session:  Increase exer as tolerated    Electronically signed by:  Lynne DUGAN#[id number]

## 2019-08-27 ENCOUNTER — HOSPITAL ENCOUNTER (OUTPATIENT)
Dept: PHYSICAL THERAPY | Age: 69
Setting detail: THERAPIES SERIES
Discharge: HOME | End: 2019-08-27
Payer: MEDICARE

## 2019-08-27 PROCEDURE — 97110 THERAPEUTIC EXERCISES: CPT | Performed by: CHIROPRACTOR

## 2019-08-27 NOTE — FLOWSHEET NOTE
Physical Therapy Daily Treatment Note  Date:  2019    Patient Name:  Radha Cason    :  1950  MRN: [id number]  Restrictions/Precautions:    Pertinent Medical History:  Medical/Treatment Diagnosis Information:  · Diagnosis: Lumbar Arthritis,Lumbar Stenosis, Cervical Spondylolisthesis, Parkinsons     Insurance/Certification information:   Medicare  Physician Information:  Referring Practitioner: Feliz Barrios CNP  Plan of care signed (Y/N):   Faxed  Visit# / total visits:    Pain level: 0/10      History of Injury:  States LBP began ~ 4 months ago for no apparent reason    Subjective:   Reports feeling good today    Objective:  Observation:   Test measurements:      Exercises:  Exercise/Equipment Resistance/Repetitions Other comments        Reviewed home exer, posture and body mechanics          Nu-step                #5 X 5 min    GSS/HSS 30 sec x 2    Stand abd 2# - 2x10      R/L         Seated reclines 2x10          LTR x10    SLR 1# - 2x10    R/L     Bridges    2x10              Gentle B LE Tx 20 sec x 6                 E-stim    (sit)  Trial without               Other Therapeutic Activities:      Home Exercise Program:  Voiced understanding of home exer    Manual Treatments:      Modalities:  E-stim, MH    Timed Code Treatment Minutes:  30    Total Treatment Minutes: 30    Assessment  [x] Patient tolerated treatment well [] Patient limited by fatigue  [] Patient limited by pain  [] Patient limited by other medical complications  [] Other:       Prognosis: [x] Good [] Fair  [] Poor    Patient Requires Follow-up: [x] Yes  [] No    Plan:   [x] Continue per plan of care [] Alter current plan (see comments)  [] Plan of care initiated [] Hold pending MD visit [] Discharge  Plan for Next Session:  Increase exer as tolerated    Electronically signed by:  Daniele BEE#[id number]

## 2019-08-29 ENCOUNTER — HOSPITAL ENCOUNTER (OUTPATIENT)
Dept: PHYSICAL THERAPY | Age: 69
Setting detail: THERAPIES SERIES
Discharge: HOME | End: 2019-08-29
Payer: MEDICARE

## 2019-08-29 PROCEDURE — 97110 THERAPEUTIC EXERCISES: CPT | Performed by: CHIROPRACTOR

## 2019-09-03 ENCOUNTER — HOSPITAL ENCOUNTER (OUTPATIENT)
Dept: PHYSICAL THERAPY | Age: 69
Setting detail: THERAPIES SERIES
Discharge: HOME | End: 2019-09-03
Payer: MEDICARE

## 2019-09-03 PROCEDURE — 97110 THERAPEUTIC EXERCISES: CPT | Performed by: CHIROPRACTOR

## 2019-09-05 ENCOUNTER — APPOINTMENT (OUTPATIENT)
Dept: PHYSICAL THERAPY | Age: 69
End: 2019-09-05
Payer: MEDICARE

## 2019-09-06 ENCOUNTER — HOSPITAL ENCOUNTER (OUTPATIENT)
Dept: PHYSICAL THERAPY | Age: 69
Setting detail: THERAPIES SERIES
Discharge: HOME | End: 2019-09-06
Payer: MEDICARE

## 2019-09-06 PROCEDURE — 97110 THERAPEUTIC EXERCISES: CPT | Performed by: CHIROPRACTOR

## 2019-09-06 NOTE — FLOWSHEET NOTE
Physical Therapy Daily Treatment Note  Date:  2019    Patient Name:  Lorenza Luque    :  1950  MRN: [id number]  Restrictions/Precautions:    Pertinent Medical History:  Medical/Treatment Diagnosis Information:  · Diagnosis: Lumbar Arthritis,Lumbar Stenosis, Cervical Spondylolisthesis, Parkinsons     Insurance/Certification information:   Medicare  Physician Information:  Referring Practitioner: Gregorio Aleman CNP  Plan of care signed (Y/N):   Faxed  Visit# / total visits:    Pain level: 0/10      History of Injury:  States LBP began ~ 4 months ago for no apparent reason    Subjective:  No complaints voiced    Objective:  Observation:   Test measurements:      Exercises:  Exercise/Equipment Resistance/Repetitions Other comments        Reviewed home exer, posture and body mechanics          Nu-step                #5 X 5 min    /HSS 30 sec x 2    Stand abd 2# - 2x10      R/L         Seated reclines 2x10          LTR x10    SLR 1# - 2x10    R/L     Bridges    2x10              Gentle B LE Tx 20 sec x 6                 E-stim    (sit)  Pt declined               Other Therapeutic Activities:      Home Exercise Program:  Voiced understanding of home exer    Manual Treatments:      Modalities:  E-stim, MH    Timed Code Treatment Minutes:  30    Total Treatment Minutes: 30    Assessment  [x] Patient tolerated treatment well [] Patient limited by fatigue  [] Patient limited by pain  [] Patient limited by other medical complications  [] Other:       Prognosis: [x] Good [] Fair  [] Poor    Patient Requires Follow-up: [x] Yes  [] No    Plan:   [x] Continue per plan of care [] Alter current plan (see comments)  [] Plan of care initiated [] Hold pending MD visit [] Discharge  Plan for Next Session: Probable DC after next appt    Electronically signed by:  Henri CASTRO#[id number]

## 2019-09-10 ENCOUNTER — HOSPITAL ENCOUNTER (OUTPATIENT)
Dept: PHYSICAL THERAPY | Age: 69
Setting detail: THERAPIES SERIES
Discharge: HOME | End: 2019-09-10
Payer: MEDICARE

## 2019-09-10 PROCEDURE — 97110 THERAPEUTIC EXERCISES: CPT | Performed by: CHIROPRACTOR

## 2019-09-10 ASSESSMENT — PAIN SCALES - QUEBEC BACK PAIN DISABILITY SCALE
WALK A FEW BLOCKS OR 300 TO 400M: 4
MOVE A CHAIR: 4
SIT IN A CHAIR FOR SEVERAL HOURS: 5
MAKE YOUR BED: 3
STAND UP FOR 20 TO 30 MINUTES: 3
TOTAL SCORE: 68
TURN OVER IN BED: 2
THROW A BALL: 2
LIFT AND CARRY A HEAVY SUITCASE: 5
RUN ONE BLOCK OR 100M: 5
QUEBEC CMS MODIFIER: CL
GET OUT OF BED: 0
SLEEP THROUGH THE NIGHT: 1
CLIMB ONE FLIGHT OF STAIRS: 3
CARRY TWO BAGS OF GROCERIES: 5
REACH UP TO HIGH SHELVES: 5
WALK SEVERAL KILOMETERS  OR MILES: 5
QUEBEC DISABILITY INDEX: 60-79%
PUT ON SOCKS OR PANYHOSE: 4
TAKE FOOD OUT OF THE REFRIGERATOR: 1
RIDE IN A CAR: 2
BEND OVER TO CLEAN THE BATHTUB: 4
PULL OR PUSH HEAVY DOORS: 5

## 2020-02-25 RX ORDER — CALCIUM CARBONATE 500(1250)
500 TABLET ORAL DAILY
COMMUNITY

## 2020-02-25 RX ORDER — FOLIC ACID 1 MG/1
1 TABLET ORAL DAILY
COMMUNITY

## 2020-02-25 RX ORDER — CARBIDOPA AND LEVODOPA 50; 200 MG/1; MG/1
1 TABLET, EXTENDED RELEASE ORAL NIGHTLY
COMMUNITY

## 2020-02-25 RX ORDER — AMANTADINE HYDROCHLORIDE 100 MG/1
100 CAPSULE, GELATIN COATED ORAL DAILY
COMMUNITY

## 2020-02-25 RX ORDER — DULOXETIN HYDROCHLORIDE 30 MG/1
30 CAPSULE, DELAYED RELEASE ORAL DAILY
COMMUNITY
End: 2020-05-21

## 2020-02-25 RX ORDER — INFLIXIMAB 100 MG/10ML
5 INJECTION, POWDER, LYOPHILIZED, FOR SOLUTION INTRAVENOUS SEE ADMIN INSTRUCTIONS
COMMUNITY

## 2020-02-25 NOTE — PROGRESS NOTES
[address] [facility] time___0920_________        Surgery time___1050_________    Take the following medications with a sip of water: Rytary    Do not eat or drink anything after 12:00 midnight prior to your surgery. This includes water chewing gum, mints and ice chips. You may brush your teeth and gargle the morning of your surgery, but do not swallow the water     Please see your family doctor/pediatrician for a history and physical and/or concerning medications. H&P 2/26/20 Dr. Donahue Pel any test results/reports from your physicians office. If you are under the care of a heart doctor or specialist doctor, please be aware that you may be asked to them for clearance    You may be asked to stop blood thinners such as Coumadin, Plavix, Fragmin, Lovenox, etc., or any anti-inflammatories such as:  Aspirin, Ibuprofen, Advil, Naproxen prior to your surgery. We also ask that you stop any OTC medications such as fish oil, vitamin E, glucosamine, garlic, Multivitamins, COQ 10, etc.    We ask that you do not smoke 24 hours prior to surgery  We ask that you do not  drink any alcoholic beverages 24 hours prior to surgery     You must make arrangements for a responsible adult to take you home after your surgery. For your safety you will not be allowed to leave alone or drive yourself home. Your surgery will be cancelled if you do not have a ride home. Also for your safety, it is strongly suggested that someone stay with you the first 24 hours after your surgery. A parent or legal guardian must accompany a child scheduled for surgery and plan to stay at the hospital until the child is discharged. Please do not bring other children with you. For your comfort, please wear simple loose fitting clothing to the hospital.  Please do not bring valuables. Wear short sleeve button down shirt or loose fitting shirt. Bring eye drops or ointment.    Do not wear any make-up or nail polish on your fingers or toes      For your safety, please do not wear any jewelry or body piercing's on the day of surgery. All jewelry must be removed. If you have dentures, they will be removed before going to operating room. For your convenience, we will provide you with a container. If you wear contact lenses or glasses, they will be removed, please bring a case for them. If you have a living will and a durable power of  for healthcare, please bring in a copy. As part of our patient safety program to minimize surgical site infections, we ask you to do the following:    · Please notify your surgeon if you develop any illness between         now and the  day of your surgery. · This includes a cough, cold, fever, sore throat, nausea,         or vomiting, and diarrhea, etc.  ·  Please notify your surgeon if you experience dizziness, shortness         of breath or blurred vision between now and the time of your surgery. Do not shave your operative site 96 hours prior to surgery. For face and neck surgery, men may use an electric razor 48 hours   prior to surgery. You may shower the night before surgery or the morning of   your surgery with an antibacterial soap. You will need to bring a photo ID and insurance card    [facility] has an onsite pharmacy, would you like to utilize our pharmacy     If you will be staying overnight and use a C-pap machine, please bring   your C-pap to hospital     Our goal is to provide you with excellent care, therefore, visitors will be limited to two(2) in the room at a time so that we may focus on providing this care for you. Please contact pre-admission testing if you have any further questions. [facility] phone number:  [phone]  Please note these are generalized instructions for all surgical cases, you may be provided with more specific instructions according to your surgery.

## 2020-03-02 ENCOUNTER — PREP FOR PROCEDURE (OUTPATIENT)
Dept: OPHTHALMOLOGY | Age: 70
End: 2020-03-02

## 2020-03-02 ENCOUNTER — ANESTHESIA EVENT (OUTPATIENT)
Dept: SURGERY | Age: 70
End: 2020-03-02
Payer: MEDICARE

## 2020-03-02 RX ORDER — CYCLOPENTOLATE HYDROCHLORIDE 10 MG/ML
1 SOLUTION/ DROPS OPHTHALMIC SEE ADMIN INSTRUCTIONS
Status: CANCELLED | OUTPATIENT
Start: 2020-03-02

## 2020-03-02 RX ORDER — CIPROFLOXACIN HYDROCHLORIDE 3.5 MG/ML
1 SOLUTION/ DROPS TOPICAL SEE ADMIN INSTRUCTIONS
Status: CANCELLED | OUTPATIENT
Start: 2020-03-02

## 2020-03-02 RX ORDER — SODIUM CHLORIDE 0.9 % (FLUSH) 0.9 %
10 SYRINGE (ML) INJECTION PRN
Status: CANCELLED | OUTPATIENT
Start: 2020-03-02

## 2020-03-02 RX ORDER — SODIUM CHLORIDE 0.9 % (FLUSH) 0.9 %
10 SYRINGE (ML) INJECTION EVERY 12 HOURS SCHEDULED
Status: CANCELLED | OUTPATIENT
Start: 2020-03-02

## 2020-03-02 RX ORDER — TETRACAINE HYDROCHLORIDE 5 MG/ML
1 SOLUTION OPHTHALMIC SEE ADMIN INSTRUCTIONS
Status: CANCELLED | OUTPATIENT
Start: 2020-03-02

## 2020-03-02 RX ORDER — PHENYLEPHRINE HYDROCHLORIDE 25 MG/ML
1 SOLUTION/ DROPS OPHTHALMIC SEE ADMIN INSTRUCTIONS
Status: CANCELLED | OUTPATIENT
Start: 2020-03-02

## 2020-03-02 NOTE — PROGRESS NOTES
[address]  [phone]        Pre-Op Phone Call:     Patient Name: Carlos Alberto Love     Telephone Information:   Mobile [phone]     Home phone:  [phone]    Surgery Time:   10:50 AM     Arrival Time:  0920     Left extended Message:  NA     Message left with:     Recent change in health status:  No     Advised of transportation/ policy:  Yes     NPO policy reviewed:  Yes PT     Advised to take morning heart/blood pressure medications with sips of water morning of surgery? Yes     Instructed to bring eye drops, photo identification, and insurance card day of surgery? Yes     Advised to wear short sleeved button down shirt (no T-shirt underneath):  Yes     Advised not to wear jewelry, hairpins, or pantyhose day of surgery? Yes     Advised not to wear make-up and to wash face day of surgery?   Yes    Remarks:        Electronically signed by:  Azael Hyatt RN at 3/2/2020 2:17 PM

## 2020-03-03 ENCOUNTER — ANESTHESIA (OUTPATIENT)
Dept: SURGERY | Age: 70
End: 2020-03-03
Payer: MEDICARE

## 2020-03-03 ENCOUNTER — HOSPITAL ENCOUNTER (OUTPATIENT)
Age: 70
Setting detail: OUTPATIENT SURGERY
Discharge: HOME | End: 2020-03-03
Attending: OPHTHALMOLOGY | Admitting: OPHTHALMOLOGY
Payer: MEDICARE

## 2020-03-03 VITALS — SYSTOLIC BLOOD PRESSURE: 118 MMHG | DIASTOLIC BLOOD PRESSURE: 67 MMHG | OXYGEN SATURATION: 100 %

## 2020-03-03 VITALS
OXYGEN SATURATION: 96 % | BODY MASS INDEX: 20.24 KG/M2 | HEART RATE: 92 BPM | WEIGHT: 110 LBS | SYSTOLIC BLOOD PRESSURE: 121 MMHG | HEIGHT: 62 IN | TEMPERATURE: 97.8 F | DIASTOLIC BLOOD PRESSURE: 58 MMHG | RESPIRATION RATE: 16 BRPM

## 2020-03-03 PROCEDURE — 6370000000 HC RX 637 (ALT 250 FOR IP): Performed by: OPHTHALMOLOGY

## 2020-03-03 PROCEDURE — 6360000002 HC RX W HCPCS: Performed by: OPHTHALMOLOGY

## 2020-03-03 PROCEDURE — 3700000000 HC ANESTHESIA ATTENDED CARE: Performed by: OPHTHALMOLOGY

## 2020-03-03 PROCEDURE — 7100000010 HC PHASE II RECOVERY - FIRST 15 MIN: Performed by: OPHTHALMOLOGY

## 2020-03-03 PROCEDURE — 3600000014 HC SURGERY LEVEL 4 ADDTL 15MIN: Performed by: OPHTHALMOLOGY

## 2020-03-03 PROCEDURE — 3700000001 HC ADD 15 MINUTES (ANESTHESIA): Performed by: OPHTHALMOLOGY

## 2020-03-03 PROCEDURE — 3600000004 HC SURGERY LEVEL 4 BASE: Performed by: OPHTHALMOLOGY

## 2020-03-03 PROCEDURE — 2709999900 HC NON-CHARGEABLE SUPPLY: Performed by: OPHTHALMOLOGY

## 2020-03-03 PROCEDURE — 2580000003 HC RX 258: Performed by: ANESTHESIOLOGY

## 2020-03-03 PROCEDURE — 6370000000 HC RX 637 (ALT 250 FOR IP)

## 2020-03-03 PROCEDURE — 6360000002 HC RX W HCPCS: Performed by: NURSE ANESTHETIST, CERTIFIED REGISTERED

## 2020-03-03 PROCEDURE — 2720000010 HC SURG SUPPLY STERILE: Performed by: OPHTHALMOLOGY

## 2020-03-03 PROCEDURE — 7100000011 HC PHASE II RECOVERY - ADDTL 15 MIN: Performed by: OPHTHALMOLOGY

## 2020-03-03 PROCEDURE — 2500000003 HC RX 250 WO HCPCS: Performed by: OPHTHALMOLOGY

## 2020-03-03 PROCEDURE — V2632 POST CHMBR INTRAOCULAR LENS: HCPCS | Performed by: OPHTHALMOLOGY

## 2020-03-03 DEVICE — LENS INTRAOCULAR 1 PC 22+ DIOPT 6X12.5 MM POST CHMBR FLD: Type: IMPLANTABLE DEVICE | Status: FUNCTIONAL

## 2020-03-03 RX ORDER — CYCLOPENTOLATE HYDROCHLORIDE 10 MG/ML
1 SOLUTION/ DROPS OPHTHALMIC SEE ADMIN INSTRUCTIONS
Status: DISCONTINUED | OUTPATIENT
Start: 2020-03-03 | End: 2020-03-03 | Stop reason: HOSPADM

## 2020-03-03 RX ORDER — SODIUM CHLORIDE 9 MG/ML
INJECTION, SOLUTION INTRAVENOUS CONTINUOUS
Status: DISCONTINUED | OUTPATIENT
Start: 2020-03-03 | End: 2020-03-03 | Stop reason: HOSPADM

## 2020-03-03 RX ORDER — SODIUM CHLORIDE 0.9 % (FLUSH) 0.9 %
10 SYRINGE (ML) INJECTION EVERY 12 HOURS SCHEDULED
Status: DISCONTINUED | OUTPATIENT
Start: 2020-03-03 | End: 2020-03-03 | Stop reason: HOSPADM

## 2020-03-03 RX ORDER — CIPROFLOXACIN HYDROCHLORIDE 3.5 MG/ML
1 SOLUTION/ DROPS TOPICAL
Status: COMPLETED | OUTPATIENT
Start: 2020-03-03 | End: 2020-03-03

## 2020-03-03 RX ORDER — TETRACAINE HYDROCHLORIDE 5 MG/ML
SOLUTION OPHTHALMIC
Status: COMPLETED
Start: 2020-03-03 | End: 2020-03-03

## 2020-03-03 RX ORDER — SODIUM CHLORIDE 0.9 % (FLUSH) 0.9 %
10 SYRINGE (ML) INJECTION EVERY 12 HOURS SCHEDULED
Status: DISCONTINUED | OUTPATIENT
Start: 2020-03-03 | End: 2020-03-03 | Stop reason: SDUPTHER

## 2020-03-03 RX ORDER — PHENYLEPHRINE HCL 2.5 %
1 DROPS OPHTHALMIC (EYE) SEE ADMIN INSTRUCTIONS
Status: DISCONTINUED | OUTPATIENT
Start: 2020-03-03 | End: 2020-03-03 | Stop reason: HOSPADM

## 2020-03-03 RX ORDER — MIDAZOLAM HYDROCHLORIDE 1 MG/ML
INJECTION INTRAMUSCULAR; INTRAVENOUS PRN
Status: DISCONTINUED | OUTPATIENT
Start: 2020-03-03 | End: 2020-03-03 | Stop reason: SDUPTHER

## 2020-03-03 RX ORDER — SODIUM CHLORIDE 0.9 % (FLUSH) 0.9 %
10 SYRINGE (ML) INJECTION PRN
Status: DISCONTINUED | OUTPATIENT
Start: 2020-03-03 | End: 2020-03-03 | Stop reason: HOSPADM

## 2020-03-03 RX ORDER — SODIUM CHLORIDE 0.9 % (FLUSH) 0.9 %
10 SYRINGE (ML) INJECTION PRN
Status: DISCONTINUED | OUTPATIENT
Start: 2020-03-03 | End: 2020-03-03 | Stop reason: SDUPTHER

## 2020-03-03 RX ORDER — TETRACAINE HYDROCHLORIDE 5 MG/ML
SOLUTION OPHTHALMIC
Status: COMPLETED | OUTPATIENT
Start: 2020-03-03 | End: 2020-03-03

## 2020-03-03 RX ORDER — BALANCED SALT SOLUTION 6.4; .75; .48; .3; 3.9; 1.7 MG/ML; MG/ML; MG/ML; MG/ML; MG/ML; MG/ML
SOLUTION OPHTHALMIC
Status: COMPLETED | OUTPATIENT
Start: 2020-03-03 | End: 2020-03-03

## 2020-03-03 RX ORDER — PREDNISOLONE ACETATE 10 MG/ML
SUSPENSION/ DROPS OPHTHALMIC
Status: COMPLETED | OUTPATIENT
Start: 2020-03-03 | End: 2020-03-03

## 2020-03-03 RX ORDER — TETRACAINE HYDROCHLORIDE 5 MG/ML
1 SOLUTION OPHTHALMIC SEE ADMIN INSTRUCTIONS
Status: DISCONTINUED | OUTPATIENT
Start: 2020-03-03 | End: 2020-03-03 | Stop reason: HOSPADM

## 2020-03-03 RX ORDER — CIPROFLOXACIN HYDROCHLORIDE 3.5 MG/ML
SOLUTION/ DROPS TOPICAL
Status: COMPLETED | OUTPATIENT
Start: 2020-03-03 | End: 2020-03-03

## 2020-03-03 RX ORDER — FENTANYL CITRATE 50 UG/ML
INJECTION, SOLUTION INTRAMUSCULAR; INTRAVENOUS PRN
Status: DISCONTINUED | OUTPATIENT
Start: 2020-03-03 | End: 2020-03-03 | Stop reason: SDUPTHER

## 2020-03-03 RX ADMIN — PHENYLEPHRINE HYDROCHLORIDE 1 DROP: 25 SOLUTION/ DROPS OPHTHALMIC at 09:51

## 2020-03-03 RX ADMIN — CYCLOPENTOLATE HYDROCHLORIDE 1 DROP: 10 SOLUTION/ DROPS OPHTHALMIC at 09:56

## 2020-03-03 RX ADMIN — CIPROFLOXACIN 1 DROP: 3 SOLUTION OPHTHALMIC at 09:57

## 2020-03-03 RX ADMIN — MIDAZOLAM 1 MG: 1 INJECTION INTRAMUSCULAR; INTRAVENOUS at 10:57

## 2020-03-03 RX ADMIN — SODIUM CHLORIDE: 0.9 INJECTION, SOLUTION INTRAVENOUS at 09:54

## 2020-03-03 RX ADMIN — PHENYLEPHRINE HYDROCHLORIDE 1 DROP: 25 SOLUTION/ DROPS OPHTHALMIC at 09:56

## 2020-03-03 RX ADMIN — FENTANYL CITRATE 50 MCG: 50 INJECTION INTRAMUSCULAR; INTRAVENOUS at 10:57

## 2020-03-03 RX ADMIN — TETRACAINE HYDROCHLORIDE: 5 SOLUTION OPHTHALMIC at 09:50

## 2020-03-03 RX ADMIN — CYCLOPENTOLATE HYDROCHLORIDE 1 DROP: 10 SOLUTION/ DROPS OPHTHALMIC at 09:51

## 2020-03-03 RX ADMIN — CIPROFLOXACIN 1 DROP: 3 SOLUTION OPHTHALMIC at 09:51

## 2020-03-03 ASSESSMENT — PAIN SCALES - GENERAL
PAINLEVEL_OUTOF10: 2
PAINLEVEL_OUTOF10: 0

## 2020-03-03 ASSESSMENT — PAIN DESCRIPTION - ORIENTATION: ORIENTATION: RIGHT

## 2020-03-03 ASSESSMENT — PAIN DESCRIPTION - LOCATION: LOCATION: EYE

## 2020-03-03 ASSESSMENT — LIFESTYLE VARIABLES: SMOKING_STATUS: 0

## 2020-03-03 ASSESSMENT — PAIN - FUNCTIONAL ASSESSMENT: PAIN_FUNCTIONAL_ASSESSMENT: 0-10

## 2020-03-03 ASSESSMENT — PAIN DESCRIPTION - DESCRIPTORS: DESCRIPTORS: ACHING

## 2020-03-03 NOTE — H&P
Date of Surgery Update:  Leslie Gee was seen, history and physical examination reviewed, and patient examined by me today. There have been no significant clinical changes since the completion of the previous history and physical.    The risk, benefits, and alternatives of the proposed procedure have been explained to the patient (or appropriate guardian) and understanding verbalized. All questions answered. Patient wishes to proceed.     Electronically signed by: Jennifer Evans MD,3/3/2020,10:25 AM

## 2020-03-03 NOTE — PROGRESS NOTES
This RN reviewed and gave patient and friend discharge instructions, drop schedule and IOL card. No further questions at this time. Patient stated ready for discharge, IV discontinued. Patient was d/c'd leaving with a shield and tape with her for fear of moving around while sleeping.

## 2020-03-03 NOTE — OP NOTE
[facility]  [address]  [phone]      3/3/2020    Patient name: Ji Jimenez  YOB: 1950  MRN: [id number]         OPERATIVE REPORT      DATE OF OPERATION: 3/3/2020     SURGEON: Daniel Silver  ASSISTANT(S): None            PREOPERATIVE DIAGNOSIS:  Age-related Nuclear Sclerotic Cataract right eye  POSTOPERATIVE DIAGNOSIS:  same    OPERATION PERFORMED: Procedure(s):  PHACOEMULSIFICATION WITH INTRAOCULAR LENS IMPLANT right eye  ANESTHESIA:   Monitor Anesthesia Care  ESTIMATED BLOOD LOSS:  None  COMPLICATIONS:  None  IOL USED: [employer]ch [employer] Lomb MX60E +22.0    INDICATIONS FOR PROCEDURE:    Best corrected visual acuity of slit lamp confirmation cataract:  20/400 glare  Patient's evaluation of visual status of operative eye:  Decreased vision with day and night driving and reading x months    DESCRIPTION OF PROCEDURE: Ji Jimenez, is a 77 y.o. female. After topical  anesthesia and pupillary dilation were obtained, the patient was prepped and draped in the usual sterile fashion for cataract implant surgery. A wire lid speculum was placed and the operating microscope was moved into position over the eye. A paracentesis clear corneal incision was made with a super blade. Approximately 0.5 ml Epi-Shugarcaine was injected into the anterior chamber. This was followed by Viscoat to fill the anterior chamber. A temporal clear corneal incision was made with a 2.75 mm keratome and a bent needle and Utrata forceps were used to perform an anterior capsulorhexis. Hydrodissection was performed on the cataract. Phacoemulsification of the nucleus was performed. Cortex was removed using an automated irrigation/aspiration hand piece and vacuuming of the posterior capsule was also carried out with the same hand piece on minimal settings. Provisc was used to re-inflate the capsular bag and a foldable intraocular lens was placed into the capsular bag. Provisc was removed from posterior to the implant and anterior to the implant by using a Wilian irrigation/aspiration hand piece. 0.5 ml Cefuroxime was placed in anterior chamber. The corneal incision was checked and the incision was watertight without suture. The wire lid speculum was removed and the patient received topical Ciprofloxacin,Pred Forte 1& and Alphagan P drops. At the conclusion of the procedure, the cornea was clear and the anterior chamber was deep, the pupil was round, and the implant was in good position. The patient tolerated the procedure well and was returned to the recovery room in good condition to be seen for follow-up the next day. ATTENDING ATTESTATION: I was present and scrubbed for the entire procedure.       ELECTRONICALLY SIGNED BY: Chino Wilkes, 3/3/2020 11:23 AM

## 2020-03-03 NOTE — ANESTHESIA POSTPROCEDURE EVALUATION
Department of Anesthesiology  Postprocedure Note    Patient: Jessenia Pruitt  MRN: [id number]  YOB: 1950  Date of evaluation: 3/3/2020  Time:  11:39 AM     Procedure Summary     Date:  03/03/20 Room / Location:  [address]    Anesthesia Start:  1054 Anesthesia Stop:  6176    Procedure:  PHACOEMULSIFICATION WITH INTRAOCULAR LENS IMPLANT (Right ) Diagnosis:       Nuclear sclerosis, right      (Nuclear sclerosis, right)    Surgeon:  Eunice Baldwin MD Responsible Provider:  Merced Akhtar MD    Anesthesia Type:  MAC ASA Status:  3          Anesthesia Type: MAC    Jackie Phase I: Jackie Score: 10    Jackie Phase II: Jackie Score: 10    Last vitals: Reviewed and per EMR flowsheets.        Anesthesia Post Evaluation    Patient location during evaluation: PACU  Patient participation: complete - patient participated  Level of consciousness: awake and alert  Pain score: 0  Airway patency: patent  Nausea & Vomiting: no nausea and no vomiting  Complications: no  Cardiovascular status: blood pressure returned to baseline  Respiratory status: acceptable  Hydration status: euvolemic

## 2020-05-21 RX ORDER — SENNOSIDES 8.8 MG/5ML
5 LIQUID ORAL NIGHTLY
COMMUNITY

## 2020-05-21 RX ORDER — CHOLECALCIFEROL (VITAMIN D3) 125 MCG
5 CAPSULE ORAL DAILY
COMMUNITY

## 2020-05-21 RX ORDER — HYDROCODONE BITARTRATE AND ACETAMINOPHEN 7.5; 325 MG/1; MG/1
1 TABLET ORAL EVERY 6 HOURS PRN
COMMUNITY

## 2020-05-21 NOTE — PROGRESS NOTES
C-Difficile admission screening and protocol:     * Admitted with diarrhea? no     *Prior history of C-Diff. In last 3 months? no     *Antibiotic use in the past 6-8 weeks? Yes eye drops     *Prior hospitalization or nursing home in the last month?     no
Preoperative Screening for Elective Surgery/Invasive Procedures While COVID-19 present in the community     Have you tested positive or have been told to self-isolate for COVID-19 like symptoms within the past 28 days? no   Do you currently have any of the following symptoms? no  o Fever >100.0 F or 99.9 F in immunocompromised patients? no  o New onset cough, shortness of breath or difficulty breathing? no  o New onset sore throat, myalgia (muscle aches and pains), headache, loss of taste/smell or diarrhea? no   Have you had a potential exposure to COVID-19 within the past 14 days by:  o Close contact with a confirmed case? no  o Close contact with a healthcare worker,  or essential infrastructure worker (grocery store, restaurant, gas station, public utilities or transportation)? no  o Do you reside in a congregate setting such as; skilled nursing facility, adult home, correctional facility, homeless shelter or other institutional setting? No  o Have you had recent travel to a known COVID-19 hotspot? no    Indicate if the patient has a positive screen by answering yes to one or more of the above questions. Patients who test positive or screen positive prior to surgery or on the day of surgery should be evaluated in conjunction with the surgeon/proceduralist/anesthesiologist to determine the urgency of the procedure.
Do not wear any make-up or nail polish on your fingers or toes      For your safety, please do not wear any jewelry or body piercing's on the day of surgery. All jewelry must be removed. If you have dentures, they will be removed before going to operating room. For your convenience, we will provide you with a container. If you wear contact lenses or glasses, they will be removed, please bring a case for them. If you have a living will and a durable power of  for healthcare, please bring in a copy. As part of our patient safety program to minimize surgical site infections, we ask you to do the following:    · Please notify your surgeon if you develop any illness between         now and the  day of your surgery. · This includes a cough, cold, fever, sore throat, nausea,         or vomiting, and diarrhea, etc.  ·  Please notify your surgeon if you experience dizziness, shortness         of breath or blurred vision between now and the time of your surgery. Do not shave your operative site 96 hours prior to surgery. For face and neck surgery, men may use an electric razor 48 hours   prior to surgery. You may shower the night before surgery or the morning of   your surgery with an antibacterial soap. You will need to bring a photo ID and insurance card    [facility] has an onsite pharmacy, would you like to utilize our pharmacy     If you will be staying overnight and use a C-pap machine, please bring   your C-pap to hospital     Our goal is to provide you with excellent care, therefore, visitors will be limited to two(2) in the room at a time so that we may focus on providing this care for you. Please contact pre-admission testing if you have any further questions. [facility] phone number:  [phone]  Please note these are generalized instructions for all surgical cases, you may be provided with more specific instructions according to your surgery.

## 2020-05-29 ENCOUNTER — PREP FOR PROCEDURE (OUTPATIENT)
Dept: OPHTHALMOLOGY | Age: 70
End: 2020-05-29

## 2020-05-29 RX ORDER — CYCLOPENTOLATE HYDROCHLORIDE 10 MG/ML
1 SOLUTION/ DROPS OPHTHALMIC SEE ADMIN INSTRUCTIONS
Status: CANCELLED | OUTPATIENT
Start: 2020-05-29

## 2020-05-29 RX ORDER — SODIUM CHLORIDE 0.9 % (FLUSH) 0.9 %
10 SYRINGE (ML) INJECTION PRN
Status: CANCELLED | OUTPATIENT
Start: 2020-05-29

## 2020-05-29 RX ORDER — TETRACAINE HYDROCHLORIDE 5 MG/ML
1 SOLUTION OPHTHALMIC SEE ADMIN INSTRUCTIONS
Status: CANCELLED | OUTPATIENT
Start: 2020-05-29

## 2020-05-29 RX ORDER — CIPROFLOXACIN HYDROCHLORIDE 3.5 MG/ML
1 SOLUTION/ DROPS TOPICAL SEE ADMIN INSTRUCTIONS
Status: CANCELLED | OUTPATIENT
Start: 2020-05-29

## 2020-05-29 RX ORDER — SODIUM CHLORIDE 0.9 % (FLUSH) 0.9 %
10 SYRINGE (ML) INJECTION EVERY 12 HOURS SCHEDULED
Status: CANCELLED | OUTPATIENT
Start: 2020-05-29

## 2020-05-29 RX ORDER — PHENYLEPHRINE HYDROCHLORIDE 25 MG/ML
1 SOLUTION/ DROPS OPHTHALMIC SEE ADMIN INSTRUCTIONS
Status: CANCELLED | OUTPATIENT
Start: 2020-05-29

## 2020-06-01 ENCOUNTER — ANESTHESIA EVENT (OUTPATIENT)
Dept: SURGERY | Age: 70
End: 2020-06-01
Payer: MEDICARE

## 2020-06-01 RX ORDER — ALBUTEROL SULFATE 90 UG/1
2 AEROSOL, METERED RESPIRATORY (INHALATION) EVERY 6 HOURS PRN
COMMUNITY

## 2020-06-02 ENCOUNTER — HOSPITAL ENCOUNTER (OUTPATIENT)
Age: 70
Setting detail: OUTPATIENT SURGERY
Discharge: HOME | End: 2020-06-02
Attending: OPHTHALMOLOGY | Admitting: OPHTHALMOLOGY
Payer: MEDICARE

## 2020-06-02 ENCOUNTER — ANESTHESIA (OUTPATIENT)
Dept: SURGERY | Age: 70
End: 2020-06-02
Payer: MEDICARE

## 2020-06-02 VITALS — OXYGEN SATURATION: 96 % | DIASTOLIC BLOOD PRESSURE: 63 MMHG | SYSTOLIC BLOOD PRESSURE: 118 MMHG

## 2020-06-02 VITALS
RESPIRATION RATE: 14 BRPM | TEMPERATURE: 97.9 F | SYSTOLIC BLOOD PRESSURE: 104 MMHG | BODY MASS INDEX: 20.24 KG/M2 | HEIGHT: 62 IN | WEIGHT: 110 LBS | HEART RATE: 72 BPM | DIASTOLIC BLOOD PRESSURE: 71 MMHG | OXYGEN SATURATION: 98 %

## 2020-06-02 PROCEDURE — 2500000003 HC RX 250 WO HCPCS: Performed by: OPHTHALMOLOGY

## 2020-06-02 PROCEDURE — 6370000000 HC RX 637 (ALT 250 FOR IP)

## 2020-06-02 PROCEDURE — 2580000003 HC RX 258: Performed by: ANESTHESIOLOGY

## 2020-06-02 PROCEDURE — 2720000010 HC SURG SUPPLY STERILE: Performed by: OPHTHALMOLOGY

## 2020-06-02 PROCEDURE — 2580000003 HC RX 258: Performed by: NURSE ANESTHETIST, CERTIFIED REGISTERED

## 2020-06-02 PROCEDURE — 3700000001 HC ADD 15 MINUTES (ANESTHESIA): Performed by: OPHTHALMOLOGY

## 2020-06-02 PROCEDURE — 3600000014 HC SURGERY LEVEL 4 ADDTL 15MIN: Performed by: OPHTHALMOLOGY

## 2020-06-02 PROCEDURE — 3700000000 HC ANESTHESIA ATTENDED CARE: Performed by: OPHTHALMOLOGY

## 2020-06-02 PROCEDURE — 6360000002 HC RX W HCPCS: Performed by: OPHTHALMOLOGY

## 2020-06-02 PROCEDURE — 6360000002 HC RX W HCPCS: Performed by: NURSE ANESTHETIST, CERTIFIED REGISTERED

## 2020-06-02 PROCEDURE — 7100000010 HC PHASE II RECOVERY - FIRST 15 MIN: Performed by: OPHTHALMOLOGY

## 2020-06-02 PROCEDURE — V2632 POST CHMBR INTRAOCULAR LENS: HCPCS | Performed by: OPHTHALMOLOGY

## 2020-06-02 PROCEDURE — 6370000000 HC RX 637 (ALT 250 FOR IP): Performed by: OPHTHALMOLOGY

## 2020-06-02 PROCEDURE — 2709999900 HC NON-CHARGEABLE SUPPLY: Performed by: OPHTHALMOLOGY

## 2020-06-02 PROCEDURE — 3600000004 HC SURGERY LEVEL 4 BASE: Performed by: OPHTHALMOLOGY

## 2020-06-02 DEVICE — LENS INTRAOCULAR 1 PC 22.5+ DIOPT 6X12.5 MM POST CHMBR FLD: Type: IMPLANTABLE DEVICE | Status: FUNCTIONAL

## 2020-06-02 RX ORDER — CYCLOPENTOLATE HYDROCHLORIDE 10 MG/ML
1 SOLUTION/ DROPS OPHTHALMIC SEE ADMIN INSTRUCTIONS
Status: DISCONTINUED | OUTPATIENT
Start: 2020-06-02 | End: 2020-06-02 | Stop reason: HOSPADM

## 2020-06-02 RX ORDER — SODIUM CHLORIDE 9 MG/ML
INJECTION, SOLUTION INTRAVENOUS CONTINUOUS
Status: DISCONTINUED | OUTPATIENT
Start: 2020-06-02 | End: 2020-06-02 | Stop reason: HOSPADM

## 2020-06-02 RX ORDER — MIDAZOLAM HYDROCHLORIDE 1 MG/ML
INJECTION INTRAMUSCULAR; INTRAVENOUS PRN
Status: DISCONTINUED | OUTPATIENT
Start: 2020-06-02 | End: 2020-06-02 | Stop reason: SDUPTHER

## 2020-06-02 RX ORDER — SODIUM CHLORIDE 9 MG/ML
INJECTION, SOLUTION INTRAVENOUS CONTINUOUS PRN
Status: DISCONTINUED | OUTPATIENT
Start: 2020-06-02 | End: 2020-06-02 | Stop reason: SDUPTHER

## 2020-06-02 RX ORDER — CIPROFLOXACIN HYDROCHLORIDE 3.5 MG/ML
1 SOLUTION/ DROPS TOPICAL
Status: COMPLETED | OUTPATIENT
Start: 2020-06-02 | End: 2020-06-02

## 2020-06-02 RX ORDER — TETRACAINE HYDROCHLORIDE 5 MG/ML
SOLUTION OPHTHALMIC
Status: COMPLETED | OUTPATIENT
Start: 2020-06-02 | End: 2020-06-02

## 2020-06-02 RX ORDER — TETRACAINE HYDROCHLORIDE 5 MG/ML
SOLUTION OPHTHALMIC
Status: DISCONTINUED
Start: 2020-06-02 | End: 2020-06-02 | Stop reason: HOSPADM

## 2020-06-02 RX ORDER — BRIMONIDINE TARTRATE 2 MG/ML
SOLUTION/ DROPS OPHTHALMIC
Status: COMPLETED | OUTPATIENT
Start: 2020-06-02 | End: 2020-06-02

## 2020-06-02 RX ORDER — OFLOXACIN 3 MG/ML
SOLUTION/ DROPS OPHTHALMIC
Status: COMPLETED | OUTPATIENT
Start: 2020-06-02 | End: 2020-06-02

## 2020-06-02 RX ORDER — SODIUM CHLORIDE 0.9 % (FLUSH) 0.9 %
10 SYRINGE (ML) INJECTION EVERY 12 HOURS SCHEDULED
Status: DISCONTINUED | OUTPATIENT
Start: 2020-06-02 | End: 2020-06-02 | Stop reason: SDUPTHER

## 2020-06-02 RX ORDER — TETRACAINE HYDROCHLORIDE 5 MG/ML
1 SOLUTION OPHTHALMIC SEE ADMIN INSTRUCTIONS
Status: DISCONTINUED | OUTPATIENT
Start: 2020-06-02 | End: 2020-06-02 | Stop reason: HOSPADM

## 2020-06-02 RX ORDER — PHENYLEPHRINE HCL 2.5 %
1 DROPS OPHTHALMIC (EYE) SEE ADMIN INSTRUCTIONS
Status: DISCONTINUED | OUTPATIENT
Start: 2020-06-02 | End: 2020-06-02 | Stop reason: HOSPADM

## 2020-06-02 RX ORDER — SODIUM CHLORIDE 0.9 % (FLUSH) 0.9 %
10 SYRINGE (ML) INJECTION PRN
Status: DISCONTINUED | OUTPATIENT
Start: 2020-06-02 | End: 2020-06-02 | Stop reason: SDUPTHER

## 2020-06-02 RX ORDER — SODIUM CHLORIDE 0.9 % (FLUSH) 0.9 %
10 SYRINGE (ML) INJECTION EVERY 12 HOURS SCHEDULED
Status: DISCONTINUED | OUTPATIENT
Start: 2020-06-02 | End: 2020-06-02 | Stop reason: HOSPADM

## 2020-06-02 RX ORDER — ONDANSETRON 2 MG/ML
4 INJECTION INTRAMUSCULAR; INTRAVENOUS
Status: DISCONTINUED | OUTPATIENT
Start: 2020-06-02 | End: 2020-06-02 | Stop reason: HOSPADM

## 2020-06-02 RX ORDER — SODIUM CHLORIDE 0.9 % (FLUSH) 0.9 %
10 SYRINGE (ML) INJECTION PRN
Status: DISCONTINUED | OUTPATIENT
Start: 2020-06-02 | End: 2020-06-02 | Stop reason: HOSPADM

## 2020-06-02 RX ADMIN — CYCLOPENTOLATE HYDROCHLORIDE 1 DROP: 10 SOLUTION/ DROPS OPHTHALMIC at 07:52

## 2020-06-02 RX ADMIN — PHENYLEPHRINE HYDROCHLORIDE 1 DROP: 25 SOLUTION/ DROPS OPHTHALMIC at 07:45

## 2020-06-02 RX ADMIN — CYCLOPENTOLATE HYDROCHLORIDE 1 DROP: 10 SOLUTION/ DROPS OPHTHALMIC at 07:45

## 2020-06-02 RX ADMIN — POVIDONE-IODINE: 5 SOLUTION OPHTHALMIC at 07:45

## 2020-06-02 RX ADMIN — PHENYLEPHRINE HYDROCHLORIDE 1 DROP: 25 SOLUTION/ DROPS OPHTHALMIC at 07:52

## 2020-06-02 RX ADMIN — SODIUM CHLORIDE: 9 INJECTION, SOLUTION INTRAVENOUS at 08:25

## 2020-06-02 RX ADMIN — CIPROFLOXACIN 1 DROP: 3 SOLUTION OPHTHALMIC at 07:45

## 2020-06-02 RX ADMIN — TETRACAINE HYDROCHLORIDE 1 DROP: 5 SOLUTION OPHTHALMIC at 07:45

## 2020-06-02 RX ADMIN — MIDAZOLAM 1 MG: 1 INJECTION INTRAMUSCULAR; INTRAVENOUS at 08:30

## 2020-06-02 RX ADMIN — SODIUM CHLORIDE: 0.9 INJECTION, SOLUTION INTRAVENOUS at 07:50

## 2020-06-02 RX ADMIN — CIPROFLOXACIN 1 DROP: 3 SOLUTION OPHTHALMIC at 07:50

## 2020-06-02 RX ADMIN — MIDAZOLAM 1 MG: 1 INJECTION INTRAMUSCULAR; INTRAVENOUS at 08:25

## 2020-06-02 ASSESSMENT — PULMONARY FUNCTION TESTS
PIF_VALUE: 1

## 2020-06-02 ASSESSMENT — PAIN SCALES - GENERAL
PAINLEVEL_OUTOF10: 0
PAINLEVEL_OUTOF10: 0

## 2020-06-02 ASSESSMENT — LIFESTYLE VARIABLES: SMOKING_STATUS: 0

## 2020-06-02 ASSESSMENT — PAIN - FUNCTIONAL ASSESSMENT: PAIN_FUNCTIONAL_ASSESSMENT: 0-10

## 2020-06-02 ASSESSMENT — PAIN DESCRIPTION - DESCRIPTORS: DESCRIPTORS: ACHING

## 2020-06-02 NOTE — OP NOTE
placed into the capsular bag. Provisc was removed from posterior to the implant and anterior to the implant by using a Wilian irrigation/aspiration hand piece. 0.2 ml Cefuroxime was placed in anterior chamber. The corneal incision was checked and the incision was watertight without suture. The wire lid speculum was removed and the patient received topical Ciprofloxacin, and Alphagan P drops. At the conclusion of the procedure, the cornea was clear and the anterior chamber was deep, the pupil was round, and the implant was in good position. The patient moved her head constantly during the procedure. The patient tolerated the procedure well and was returned to the recovery room in good condition to be seen for follow-up the next day. ATTENDING ATTESTATION: I was present and scrubbed for the entire procedure.       ELECTRONICALLY SIGNED BY: Danny Draper, 6/2/2020 9:02 AM

## 2020-06-02 NOTE — ANESTHESIA POSTPROCEDURE EVALUATION
Department of Anesthesiology  Postprocedure Note    Patient: Alphonse Cole  MRN: [id number]  YOB: 1950  Date of evaluation: 6/2/2020  Time:  10:16 AM     Procedure Summary     Date:  06/02/20 Room / Location:  [facility]    Anesthesia Start:  0825 Anesthesia Stop:  2267    Procedure:  Phacoemulsification with intraocular lens implant (Left ) Diagnosis:       Nuclear sclerosis, left      (Nuclear sclerosis, left)    Surgeon:  Armani Celeste MD Responsible Provider:  Nathaniel Mosher MD    Anesthesia Type:  MAC ASA Status:  3          Anesthesia Type: MAC    Jackie Phase I: Jackie Score: 10    Jackie Phase II: Jackie Score: 10    Last vitals: Reviewed and per EMR flowsheets.        Anesthesia Post Evaluation    Patient location during evaluation: bedside  Patient participation: complete - patient participated  Level of consciousness: awake  Pain score: 1  Airway patency: patent  Nausea & Vomiting: no nausea and no vomiting  Complications: no  Cardiovascular status: blood pressure returned to baseline  Respiratory status: acceptable  Hydration status: euvolemic

## 2020-06-09 NOTE — H&P
Date of Surgery Update:  Daniela Parr was seen, history and physical examination reviewed, and patient examined by me today. There have been no significant clinical changes since the completion of the previous history and physical.    The risk, benefits, and alternatives of the proposed procedure have been explained to the patient (or appropriate guardian) and understanding verbalized. All questions answered. Patient wishes to proceed.     Electronically signed by: Yris Ruff MD,6/9/2020,12:08 PM

## 2022-11-27 ENCOUNTER — HOSPITAL ENCOUNTER (EMERGENCY)
Age: 72
Discharge: HOME | End: 2022-11-27
Attending: EMERGENCY MEDICINE
Payer: MEDICARE

## 2022-11-27 VITALS
HEIGHT: 62 IN | HEART RATE: 93 BPM | DIASTOLIC BLOOD PRESSURE: 72 MMHG | SYSTOLIC BLOOD PRESSURE: 108 MMHG | OXYGEN SATURATION: 98 % | BODY MASS INDEX: 18.86 KG/M2 | TEMPERATURE: 97.5 F | WEIGHT: 102.51 LBS | RESPIRATION RATE: 16 BRPM

## 2022-11-27 DIAGNOSIS — M25.562 CHRONIC PAIN OF LEFT KNEE: Primary | ICD-10-CM

## 2022-11-27 DIAGNOSIS — G89.29 CHRONIC PAIN OF LEFT KNEE: Primary | ICD-10-CM

## 2022-11-27 LAB
ANION GAP SERPL CALCULATED.3IONS-SCNC: 10 MMOL/L (ref 3–16)
BASOPHILS ABSOLUTE: 0.1 K/UL (ref 0–0.2)
BASOPHILS RELATIVE PERCENT: 1.2 %
BUN BLDV-MCNC: 17 MG/DL (ref 7–20)
CALCIUM SERPL-MCNC: 9.6 MG/DL (ref 8.3–10.6)
CHLORIDE BLD-SCNC: 98 MMOL/L (ref 99–110)
CO2: 27 MMOL/L (ref 21–32)
CREAT SERPL-MCNC: 0.5 MG/DL (ref 0.6–1.2)
D DIMER: 0.63 UG/ML FEU (ref 0–0.6)
EOSINOPHILS ABSOLUTE: 0.1 K/UL (ref 0–0.6)
EOSINOPHILS RELATIVE PERCENT: 2.3 %
GFR SERPL CREATININE-BSD FRML MDRD: >60 ML/MIN/{1.73_M2}
GLUCOSE BLD-MCNC: 96 MG/DL (ref 70–99)
HCT VFR BLD CALC: 39.1 % (ref 36–48)
HEMOGLOBIN: 12.7 G/DL (ref 12–16)
LYMPHOCYTES ABSOLUTE: 1.2 K/UL (ref 1–5.1)
LYMPHOCYTES RELATIVE PERCENT: 23.6 %
MCH RBC QN AUTO: 29.9 PG (ref 26–34)
MCHC RBC AUTO-ENTMCNC: 32.6 G/DL (ref 31–36)
MCV RBC AUTO: 92 FL (ref 80–100)
MONOCYTES ABSOLUTE: 0.5 K/UL (ref 0–1.3)
MONOCYTES RELATIVE PERCENT: 9.4 %
NEUTROPHILS ABSOLUTE: 3.3 K/UL (ref 1.7–7.7)
NEUTROPHILS RELATIVE PERCENT: 63.5 %
PDW BLD-RTO: 15.6 % (ref 12.4–15.4)
PLATELET # BLD: 249 K/UL (ref 135–450)
PMV BLD AUTO: 7 FL (ref 5–10.5)
POTASSIUM REFLEX MAGNESIUM: 4.6 MMOL/L (ref 3.5–5.1)
RBC # BLD: 4.25 M/UL (ref 4–5.2)
SODIUM BLD-SCNC: 135 MMOL/L (ref 136–145)
WBC # BLD: 5.3 K/UL (ref 4–11)

## 2022-11-27 PROCEDURE — 99283 EMERGENCY DEPT VISIT LOW MDM: CPT

## 2022-11-27 PROCEDURE — 80048 BASIC METABOLIC PNL TOTAL CA: CPT

## 2022-11-27 PROCEDURE — 85025 COMPLETE CBC W/AUTO DIFF WBC: CPT

## 2022-11-27 PROCEDURE — 6370000000 HC RX 637 (ALT 250 FOR IP): Performed by: PHYSICIAN ASSISTANT

## 2022-11-27 PROCEDURE — 85379 FIBRIN DEGRADATION QUANT: CPT

## 2022-11-27 PROCEDURE — 36415 COLL VENOUS BLD VENIPUNCTURE: CPT

## 2022-11-27 RX ORDER — LIDOCAINE 4 G/G
1 PATCH TOPICAL ONCE
Status: DISCONTINUED | OUTPATIENT
Start: 2022-11-27 | End: 2022-11-27 | Stop reason: HOSPADM

## 2022-11-27 RX ORDER — ACETAMINOPHEN 500 MG
1000 TABLET ORAL 2 TIMES DAILY PRN
Qty: 60 TABLET | Refills: 0 | Status: SHIPPED | OUTPATIENT
Start: 2022-11-27

## 2022-11-27 RX ORDER — LIDOCAINE 4 G/G
1 PATCH TOPICAL DAILY
Qty: 30 PATCH | Refills: 0 | Status: SHIPPED | OUTPATIENT
Start: 2022-11-27 | End: 2022-12-27

## 2022-11-27 RX ORDER — IPRATROPIUM BROMIDE AND ALBUTEROL SULFATE 2.5; .5 MG/3ML; MG/3ML
SOLUTION RESPIRATORY (INHALATION)
Status: DISCONTINUED
Start: 2022-11-27 | End: 2022-11-27 | Stop reason: HOSPADM

## 2022-11-27 ASSESSMENT — ENCOUNTER SYMPTOMS
VOMITING: 0
COUGH: 0
ABDOMINAL PAIN: 0
COLOR CHANGE: 0
SHORTNESS OF BREATH: 0
NAUSEA: 0

## 2022-11-27 ASSESSMENT — PAIN SCALES - GENERAL
PAINLEVEL_OUTOF10: 7
PAINLEVEL_OUTOF10: 8

## 2022-11-27 ASSESSMENT — LIFESTYLE VARIABLES
HOW OFTEN DO YOU HAVE A DRINK CONTAINING ALCOHOL: NEVER
HOW MANY STANDARD DRINKS CONTAINING ALCOHOL DO YOU HAVE ON A TYPICAL DAY: PATIENT DOES NOT DRINK

## 2022-11-27 ASSESSMENT — PAIN DESCRIPTION - LOCATION: LOCATION: LEG

## 2022-11-27 ASSESSMENT — PAIN DESCRIPTION - ORIENTATION: ORIENTATION: LEFT

## 2022-11-27 ASSESSMENT — PAIN DESCRIPTION - DESCRIPTORS: DESCRIPTORS: CRAMPING;SHOOTING;SPASM

## 2022-11-27 ASSESSMENT — PAIN - FUNCTIONAL ASSESSMENT: PAIN_FUNCTIONAL_ASSESSMENT: 0-10

## 2022-11-27 NOTE — ED NOTES
D/C: Order noted for d/c. Pt confirmed d/c paperwork have correct name. Discharge and education instructions reviewed with patient. Teach-back successful. Pt verbalized understanding and signed d/c papers. Pt denied questions at this time. No acute distress noted. Patient instructed to follow-up as noted - return to emergency department if symptoms worsen. Patient verbalized understanding. Discharged per EDMD with discharge instructions. Pt discharged to private vehicle. Patient stable upon departure. Thanked patient for choosing [facility]) for care. Provider aware of patient pain at time of discharge.        Trisha Olguin RN  11/27/22 2100

## 2022-11-27 NOTE — DISCHARGE INSTRUCTIONS
- We recommend:   Tylenol (acetaminophen) 1000mg twice daily   Diclofenac gel topically three times daily   Lidocaine patch at bedtime    Keep appt for EMG on 12/1    Follow up with PCP, ortho, rheumatologist, and pain management physician. Ask them about if you should add Cymbalta (duloxetine) to your regimen to help further manage your pain. Return with any new or worrisome symptoms.

## 2022-11-27 NOTE — ED PROVIDER NOTES
[address] [facility]        Pt Name: Juan Antonio Moulton  MRN: [id number]  [place] 1950  Date of evaluation: 11/27/2022  Provider: DIVINE Cota  PCP: Idania Hogan MD  Note Started: 1:09 PM EST 11/27/22       I have seen and evaluated this patient with my supervising physician Elpidio Alonso MD.    [address] [place] [address]       Chief Complaint   Patient presents with    Pain     LLE pain since September, has been to [facility] twice had imagining, had an MRI, has seen ortho and PCP, has been referred to her pain management by several doctors, she cannot get into that MD until Dec. 5th. She is \"here for something stronger\" than what she is taking. Pt is taking Tylenol, tizanidine, and gabapentin. She recently had injection for the RLE       HISTORY OF PRESENT ILLNESS   (Location, Timing/Onset, Context/Setting, Quality, Duration, Modifying Factors, Severity, Associated Signs and Symptoms)  Note limiting factors. Chief Complaint: Left leg pain    Juan Antonio Moulton is a 71 y.o. female who presents to the emergency department with complaints of left leg pain. Her symptoms have been chronic, staring sometime in late September with no specific injury or trauma precipitating her pain. She has a history of juvenile RA, and had been in remission until around age 40, and started taking remicade and low dose methotrexate. She has had multiple appointments with various specialists in the past 2 months related to her left knee and left leg pain, and presents today because she continues to have pain that makes it difficult for her to get comfortable, let alone sleep at night. She states the pain feels like it is behind her knee cap, and is very sharp and made worse with movement and sometimes palpation. On 9/27/22 she saw her PCP, after starting to experience the pain the previous weekend on 9/24 with no trauma or falls.  At the time the pain was described as shooting, constant, and aggravated by movement. She had attempted acetaminophen but did not get much relief to her symptoms. She was advised to use ice, ace wrap, rest, and tylenol. She was also given lortab for the pain as well. On 10/12/22 she saw her rheumatologist, no change was made to her medications and there was no mention at that appointment of her left knee pain, per documentation. On 11/01/22, she again saw her PCP for left knee pain, symptoms worsening. She was given a refill of lortab and plain films were ordered, which she completed the same day. Plain films showed no acute fracture or dislocation, she does have some chondrocalcinosis that was identified in the lateral joint space. On 11/02/22 she saw her rheumatologist again, to be evaluated for the left knee pain. She was found to have tenderness on the lateral joint line of the left knee with no swelling or effusion, and she maintained full range of motion. She received a joint injection of celestone. On 11/05/22 she was evaluated at [facility] ER due to worsening pain that prevented her from sleeping. At the time of the visit she had pain that was relatively well controlled, so she was d/c home with motrin, norco, and referral to orthopedics for follow up. On 11/06/22, she was again evaluated at [facility] ER due to the pain, she was again advised to continue the pain medication that had been given to her previously and follow up with orthopedics. On 11/15/22, she was seen at pain management, and per record review in the interim had been seen at [address] [place] [address] where an MRI has been ordered and performed and that did not show any cause for her pain. At that visit she reported she had no pain and felt that she was pain free due to Tizanadine. On 11/21/22, she was seen again at her PCP's office. MRI results were reviewed and she is due to have an EMG done on 12/1.  Her PCP discussed increasing her gabapentin prescription, and was working to coordinate with her pain management physician to increase the dose. On 11/26/22 she called the pain management's office nurse line and advised that she is having severe pain in both knees, and the combination of tizanadine, gabapentin is not helping her symptoms. She was advised to go to the ER due to her excruciating pain, but the patient at the time did not want to go because she doesn't think much can be done there. She was advised to follow up with the office on Monday. She has no further complaints. Nursing Notes were all reviewed and agreed with or any disagreements were addressed in the HPI. REVIEW OF SYSTEMS    (2-9 systems for level 4, 10 or more for level 5)     Review of Systems   Constitutional:  Negative for chills and fever. Respiratory:  Negative for cough and shortness of breath. Cardiovascular:  Negative for chest pain, palpitations and leg swelling. Gastrointestinal:  Negative for abdominal pain, nausea and vomiting. Musculoskeletal:  Positive for arthralgias. Negative for joint swelling, neck pain and neck stiffness. Skin:  Negative for color change, rash and wound. Neurological:  Negative for dizziness, weakness, light-headedness and numbness. Psychiatric/Behavioral:  Negative for agitation and confusion. Positives and Pertinent negatives as per HPI. Except as noted above in the ROS, all other systems were reviewed and negative.        PAST MEDICAL HISTORY     Past Medical History:   Diagnosis Date    Anxiety     Arthritis     Arthritis, rheumatoid ([address]     Bilateral tinnitus     Dyskinesia     Parkinson disease ([address]     Pinched nerve in neck          SURGICAL HISTORY     Past Surgical History:   Procedure Laterality Date    BACK SURGERY  2012    FOOT SURGERY  2011    X 2    HAND SURGERY  2007    HAND SURGERY Right     INTRACAPSULAR CATARACT EXTRACTION Right 3/3/2020    PHACOEMULSIFICATION WITH INTRAOCULAR LENS IMPLANT performed by Moris Faith MD at [address] Left 6/2/2020    Phacoemulsification with intraocular lens implant performed by Moris Faith MD at [facility]       Previous Medications    ALBUTEROL SULFATE HFA (VENTOLIN HFA) 108 (90 BASE) MCG/ACT INHALER    Inhale 2 puffs into the lungs every 6 hours as needed for Wheezing    AMANTADINE (SYMMETREL) 100 MG CAPSULE    Take 100 mg by mouth daily Indications: 1000    CALCIUM CARBONATE (OSCAL) 500 MG TABS TABLET    Take 500 mg by mouth daily    CARBIDOPA-LEVODOPA (RYTARY) 61. MG CPCR PER EXTENDED RELEASE CAPSULE    Take 1 capsule by mouth 2 times daily Indications: 2 capsules 0700, 1300 1 capule, 1900 1 capsule    CARBIDOPA-LEVODOPA (SINEMET CR)  MG PER EXTENDED RELEASE TABLET    Take 1 tablet by mouth nightly     CARBIDOPA-LEVODOPA ER (RYTARY) 23.75-95 MG CPCR    Take 1 capsule by mouth daily Indications: 2 capsule 1000 AM, 2 capsules 1600     CARBIDOPA-LEVODOPA ER (RYTARY) 36. MG CPCR    Take by mouth Indications: 1300 1 capsule and 1 capsule at 8657     FOLIC ACID (FOLVITE) 1 MG TABLET    Take 1 mg by mouth daily Indications: takes 3 tablets daily     HYDROCODONE-ACETAMINOPHEN (NORCO) 7.5-325 MG PER TABLET    Take 1 tablet by mouth every 6 hours as needed for Pain. INFLIXIMAB (REMICADE) 100 MG INJECTION    Infuse 5 mg/kg intravenously See Admin Instructions Indications: every 8 weeks    MELATONIN 5 MG TABS TABLET    Take 5 mg by mouth daily    METHOTREXATE (RHEUMATREX) 2.5 MG CHEMO TABLET    Take by mouth once a week Indications: Friday 3 tablets    NONFORMULARY    Dragon Lotion    SENNA (SENOKOT) 8.8 MG/5ML SYRP SYRUP    Take 5 mLs by mouth nightly         ALLERGIES     Azithromycin    FAMILYHISTORY     History reviewed. No pertinent family history.        SOCIAL HISTORY       Social History     Tobacco Use    Smoking status: Former    Smokeless tobacco: Never   Substance Use Topics    Alcohol use: Not Currently    Drug use: Never       SCREENINGS    [place] Coma Scale  Eye Opening: Spontaneous  Best Verbal Response: Oriented  Best Motor Response: Obeys commands  [place] Coma Scale Score: 15        PHYSICAL EXAM    (up to 7 for level 4, 8 or more for level 5)     ED Triage Vitals [11/27/22 1257]   BP Temp Temp Source Heart Rate Resp SpO2 Height Weight   122/63 97.5 °F (36.4 °C) Tympanic 76 17 99 % 5' 2\" (1.575 m) 102 lb 8.2 oz (46.5 kg)       Physical Exam  Vitals and nursing note reviewed. Constitutional:       General: She is not in acute distress. Appearance: Normal appearance. She is well-developed. She is not ill-appearing, toxic-appearing or diaphoretic. HENT:      Head: Normocephalic and atraumatic. Mouth/Throat:      Mouth: Mucous membranes are moist.      Pharynx: Oropharynx is clear. Eyes:      Conjunctiva/sclera: Conjunctivae normal.      Pupils: Pupils are equal, round, and reactive to light. Cardiovascular:      Rate and Rhythm: Normal rate and regular rhythm. Pulses:           Posterior tibial pulses are 2+ on the right side and 2+ on the left side. Pulmonary:      Effort: Pulmonary effort is normal. No respiratory distress. Musculoskeletal:         General: Tenderness (left knee, lateral joint line and to some degree anterior aspect of knee) present. No deformity. Right lower leg: No lacerations or tenderness. No edema. Left lower leg: No lacerations or tenderness. No edema. Comments: Frequently grimaces in pain with involuntary movement of LLE   Skin:     General: Skin is warm and dry. Neurological:      General: No focal deficit present. Mental Status: She is alert and oriented to person, place, and time. Comments: H/o Parkinson's disease and is constantly moving during interview, examination   Psychiatric:         Behavior: Behavior normal. Behavior is cooperative.        DIAGNOSTIC RESULTS   LABS:    Labs Reviewed CBC WITH AUTO DIFFERENTIAL - Abnormal; Notable for the following components:       Result Value    RDW 15.6 (*)     All other components within normal limits   BASIC METABOLIC PANEL W/ REFLEX TO MG FOR LOW K - Abnormal; Notable for the following components:    Sodium 135 (*)     Chloride 98 (*)     Creatinine 0.5 (*)     All other components within normal limits   D-DIMER, QUANTITATIVE - Abnormal; Notable for the following components:    D-Dimer, Quant 0.63 (*)     All other components within normal limits       When ordered only abnormal lab results are displayed. All other labs were within normal range or not returned as of this dictation. EKG: When ordered, EKG's are interpreted by the Emergency Department Physician in the absence of a cardiologist.  Please see their note for interpretation of EKG. RADIOLOGY:   Non-plain film images such as CT, Ultrasound and MRI are read by the radiologist. Plain radiographic images are visualized and preliminarily interpreted by the ED Provider with the below findings:    Interpretation per the Radiologist below, if available at the time of this note:    No orders to display     No results found. PROCEDURES   Unless otherwise noted below, none     Procedures    CONSULTS:  None      EMERGENCY DEPARTMENT COURSE and DIFFERENTIAL DIAGNOSIS/MDM:   Vitals:    Vitals:    11/27/22 1257 11/27/22 1645   BP: 122/63 108/72   Pulse: 76 93   Resp: 17 16   Temp: 97.5 °F (36.4 °C)    TempSrc: Tympanic    SpO2: 99% 98%   Weight: 102 lb 8.2 oz (46.5 kg)    Height: 5' 2\" (1.575 m)        Patient was given the following medications:  Medications   ipratropium-albuterol (DUONEB) 0.5-2.5 (3) MG/3ML nebulizer solution (  Canceled Entry 11/27/22 1348)   lidocaine 4 % external patch 1 patch (1 patch TransDERmal Patch Applied 11/27/22 1355)         Is this patient to be included in the SEP-1 Core Measure due to severe sepsis or septic shock?    No   Exclusion criteria - the patient is NOT to be included for SEP-1 Core Measure due to: Infection is not suspected    ED COURSE & MEDICAL DECISION MAKING    - The patient presented to the ER with complaints of left leg pain, left knee pain. Vital signs were reviewed. Exam as above. Labs ordered. Imaging from previous visits reviewed. - Pertinent Labs & Imaging studies reviewed. (See chart for details)   -  Patient seen and evaluated in the emergency department. -  Triage and nursing notes reviewed and incorporated. -  Old chart records reviewed and incorporated. See HPI for detailed breakdown of previous visits/recommendations/treatments. -   I have seen and evaluated this patient with my supervising physician Leoncio Nicole MD.  -  Differential diagnosis includes: Arthritis, meniscal injury, ligamentous and  -  Work-up included:  See above  -  ED treatment included:  lidocaine patch  -  Results discussed with patient and/or family. Labs show no leukocytosis or concerning anemia, metabolic panel with no concerning abnormalities. Metabolic panel with no concerning abnormalities. D-dimer is mildly elevated at 0.63, however when age-adjusted it is within normal range. We have low suspicion for DVT based on physical exam.   At this time, we recommend discharge, as the patient is stable for outpatient management. She will be discharged home with recommendations to take Tylenol 1000 mg twice a day, apply lidocaine patches at bedtime, and use diclofenac gel 3 times a day. We discussed starting her on Cymbalta, at this time she would like to defer and discuss it further with her pain management and/or primary care physician. She was advised to keep her appointment for her EMG on December 1. She was given very strict return precautions. The patient and/or family is agreeable with plan of care and disposition.  -  Disposition:  Home in stable condition  - Critical Care:  0 minutes    FINAL IMPRESSION      1.  Chronic pain of left knee DISPOSITION/PLAN   DISPOSITION Decision To Discharge 11/27/2022 04:48:13 PM      PATIENT REFERRED TO:  Jason Colvin MD  [address] [facility]  [id number]  [address] [facility] [place] [address]  [phone]    Schedule an appointment as soon as possible for a visit       [address] [place] [address], [address] [place] [address]  [phone]    Schedule an appointment as soon as possible for a visit       SELENE Atwood NP  [address] [facility] [address]  [address]  [phone]    Schedule an appointment as soon as possible for a visit       [facility] Emergency Department  [address] [place] [address]  [phone]    If symptoms worsen    DISCHARGE MEDICATIONS:  New Prescriptions    ACETAMINOPHEN (TYLENOL) 500 MG TABLET    Take 2 tablets by mouth 2 times daily as needed for Pain    DICLOFENAC SODIUM (VOLTAREN) 1 % GEL    Apply 4 g topically in the morning, at noon, and at bedtime    LIDOCAINE 4 % EXTERNAL PATCH    Place 1 patch onto the skin daily       DISCONTINUED MEDICATIONS:  Discontinued Medications    No medications on file              (Please note that portions of this note were completed with a voice recognition program.  Efforts were made to edit the dictations but occasionally words are mis-transcribed.)    DIVINE Laureano (electronically signed)           Jamila Laureano  11/27/22 2547

## 2024-05-08 ENCOUNTER — HOSPITAL ENCOUNTER (OUTPATIENT)
Dept: PHYSICAL THERAPY | Age: 74
Setting detail: THERAPIES SERIES
Discharge: HOME | End: 2024-05-08
Payer: MEDICARE

## 2024-05-08 DIAGNOSIS — Z74.09 IMPAIRED MOBILITY AND ADLS: ICD-10-CM

## 2024-05-08 DIAGNOSIS — R29.898 WEAKNESS OF BOTH LOWER EXTREMITIES: ICD-10-CM

## 2024-05-08 DIAGNOSIS — R26.9 GAIT DIFFICULTY: ICD-10-CM

## 2024-05-08 DIAGNOSIS — M54.42 CHRONIC BILATERAL LOW BACK PAIN WITH BILATERAL SCIATICA: Primary | ICD-10-CM

## 2024-05-08 DIAGNOSIS — Z78.9 IMPAIRED MOBILITY AND ADLS: ICD-10-CM

## 2024-05-08 DIAGNOSIS — M54.41 CHRONIC BILATERAL LOW BACK PAIN WITH BILATERAL SCIATICA: Primary | ICD-10-CM

## 2024-05-08 DIAGNOSIS — M25.60 JOINT STIFFNESS: ICD-10-CM

## 2024-05-08 DIAGNOSIS — G89.29 CHRONIC BILATERAL LOW BACK PAIN WITH BILATERAL SCIATICA: Primary | ICD-10-CM

## 2024-05-08 PROCEDURE — 97162 PT EVAL MOD COMPLEX 30 MIN: CPT

## 2024-05-08 PROCEDURE — 97110 THERAPEUTIC EXERCISES: CPT

## 2024-05-08 PROCEDURE — 97530 THERAPEUTIC ACTIVITIES: CPT

## 2024-05-08 NOTE — PLAN OF CARE
above    Medical Necessity Documentation:  I certify that this patient meets the below criteria necessary for medical necessity for care and/or justification of therapy services:  The patient has generalized musculoskeletal conditions or a condition affecting multiple sites that will have a direct impact on the rate of recovery    Return to Play: NA    Prognosis for POC: [x] Good [] Fair  [] Poor    Patient requires continued skilled intervention: [x] Yes  [] No      CHARGE CAPTURE     PT CHARGE GRID   CPT Code (TIMED) minutes # CPT Code (UNTIMED) #     Therex (25358)  8 1  EVAL:MODERATE (14466 - Typically 30 minutes face-to-face) 1    Neuromusc. Re-ed (01409)    Re-Eval (87759)     Manual (83008)    Estim Unattended (87240)     Ther. Act (37691) 15 1  Mech. Traction (07526)     Gait (42867)    Dry Needle 1-2 muscle (16118)     Aquatic Therex (97940)    Dry Needle 3+ muscle (20561)     Iontophoresis (48032)    VASO (77374)     Ultrasound (36696)    Group Therapy (93456)     Estim Attended (18325)    Canalith Repositioning (92610)     Other:    Other:    Total Timed Code Tx Minutes 23 2  1     Total Treatment Minutes 53        Charge Justification:  (15399) HOME EXERCISE PROGRAM - Reviewed/Progressed HEP activities related to strengthening, flexibility, endurance, ROM performed to prevent loss of range of motion, maintain or improve muscular strength or increase flexibility, following either an injury or surgery.  (45214) THERAPEUTIC ACTIVITY - use of dynamic activities to improve functional performance. (Ex include squatting, ascending/descending stairs, walking, bending, lifting, catching, throwing, pushing, pulling, jumping.)  Direct, one on one contact, billed in 15-minute increments.    GOALS     Patient stated goal: \"   [] Progressing: [] Met: [] Not Met: [] Adjusted    Therapist goals for Patient:   Short Term Goals: To be achieved in: 2 weeks  1. Independent in HEP and progression per patient tolerance, in

## 2024-05-09 ENCOUNTER — HOSPITAL ENCOUNTER (OUTPATIENT)
Dept: PHYSICAL THERAPY | Age: 74
Setting detail: THERAPIES SERIES
Discharge: HOME | End: 2024-05-09
Payer: MEDICARE

## 2024-05-09 PROCEDURE — 97113 AQUATIC THERAPY/EXERCISES: CPT

## 2024-05-09 NOTE — FLOWSHEET NOTE
without increased symptoms or restriction to enable improved endurance with performing household chores.    [] Progressing: [] Met: [] Not Met: [] Adjusted     Overall Progression Towards Functional goals/ Treatment Progress Update:  [] Patient is progressing as expected towards functional goals listed.    [] Progression is slowed due to complexities/Impairments listed.  [] Progression has been slowed due to co-morbidities.  [x] Plan just implemented, too soon (<30days) to assess goals progression   [] Goals require adjustment due to lack of progress  [] Patient is not progressing as expected and requires additional follow up with physician  [] Other:     TREATMENT PLAN     Frequency/Duration: 2x/week for 8 weeks for the following treatment interventions:    Interventions:  Therapeutic Exercise (10003) including: strength training, ROM, and functional mobility  Therapeutic Activities (52318) including: functional mobility training and education.  Neuromuscular Re-education (28640) activation and proprioception, including postural re-education.    Gait Training (63424) for normalization of ambulation patterns and AD training.   Manual Therapy (86869) as indicated to include: Passive Range of Motion, Gr I-IV mobilizations, and Soft Tissue Mobilization  Modalities as needed that may include: Cryotherapy, Electrical Stimulation, Thermal Agents, Vasoneumatic Compression, Traction, and Ultrasound  Patient education on postural re-education, activity modification, and progression of HEP  Aquatic Therapy (54031)     5/7 Plan: Cont POC- Continue emphasis/focus on improving proper muscle recruitment and activation/motor control patterns, modulating pain, and increasing ROM. Next visit plan to add ex per flow sheet     Electronically Signed by Cecilia Mulligan PT  Date: 05/09/2024     Note: Portions of this note have been templated and/or copied from initial evaluation, reassessments and prior notes for documentation

## 2024-05-14 ENCOUNTER — HOSPITAL ENCOUNTER (OUTPATIENT)
Dept: PHYSICAL THERAPY | Age: 74
Setting detail: THERAPIES SERIES
Discharge: HOME | End: 2024-05-14
Payer: MEDICARE

## 2024-05-14 PROCEDURE — 97113 AQUATIC THERAPY/EXERCISES: CPT

## 2024-05-14 NOTE — FLOWSHEET NOTE
emphasis/focus on improving proper muscle recruitment and activation/motor control patterns and modulating pain. Next visit plan to add ex per flow sheet     Electronically Signed by Cecilia Mulligan PT  Date: 05/14/2024     Note: Portions of this note have been templated and/or copied from initial evaluation, reassessments and prior notes for documentation efficiency.    Note: If patient does not return for scheduled/recommended follow up visits, this note will serve as a discharge from care along with the most recent update on progress.    Ortho Evaluation

## 2024-05-16 ENCOUNTER — HOSPITAL ENCOUNTER (OUTPATIENT)
Dept: PHYSICAL THERAPY | Age: 74
Setting detail: THERAPIES SERIES
Discharge: HOME | End: 2024-05-16
Payer: MEDICARE

## 2024-05-16 PROCEDURE — 97113 AQUATIC THERAPY/EXERCISES: CPT

## 2024-05-16 NOTE — FLOWSHEET NOTE
Pec   Small ball pull downs                   diagonals             Cervical:       AROM Flex       AROM Extension     LEs exercises:  10 each  AROM Side Bending    Marching  x AROM Rotation add   Heel Raises  X  less painful Chin tucks    Toe Raises  X  less  painful Chin nods     Squats X      Hamstring Curls  X      Hip Flexion  X Balance:     Hip Abduction X SLS    Hip Circles X  VC for tech Tandem stance 30 sec R/L no support with CG   TA set  NBOS eyes open    Glut Set 5 difficulty NBOS eyes closed    Hip Extension X   VC Hand to Opposite Knee    Hip Adduction    Box Step     Hip IR   Noodle Stance     Hip ER  Stop/Go Gait    Fig 8's  Switch Gait                Seated: 10 each Functional:  10 each   Ankle Pumps x Step up forward X   Ankle circles x Step up lateral    Knee flex & ext x Step down    Hip Abd & Adduction x [place] squats    Bicycle  1 MIN  Crate Lifts    Add Set with ball X  with assist to stabilize ball Lunges forward    LX stab with med ball throws  Lunges lateral    Ankle INV  Lunges retro    Ankle EV  Lower ab curl with noodle      Upper ab curl with ball      Med ball straight lifts      Med ball diagonal lifts      Hydrorider          Noodle:      Leg Press  Deep Water:    Noodle hang at wall  Jog    Noodle hang deep water  Jumping Jacks    Noodle Bicycle  Heel to toe      Hand to opposite knee      Cross country skier      Rocking Horse        Modalities:    No modalities applied this session    Education/Home Exercise Program:  Reviewed previous HEP given by Home PT.  Pt encouraged to begin exercises again starting with only 5-10 reps per exercise.       ASSESSMENT   Assessment:   Deanna Valencia is a 73 y.o. female presenting today to Outpatient PT with signs and symptoms consistent with hypomobility, core/LE weakness, and poor flexibility.    Pt. presents with the functional impairments and activity limitations listed below and would benefit from Outpatient PT to address the

## 2024-05-21 ENCOUNTER — HOSPITAL ENCOUNTER (OUTPATIENT)
Dept: PHYSICAL THERAPY | Age: 74
Setting detail: THERAPIES SERIES
Discharge: HOME | End: 2024-05-21
Payer: MEDICARE

## 2024-05-21 PROCEDURE — 97113 AQUATIC THERAPY/EXERCISES: CPT

## 2024-05-21 NOTE — FLOWSHEET NOTE
listed.  [] Progression has been slowed due to co-morbidities.  [x] Plan just implemented, too soon (<30days) to assess goals progression   [] Goals require adjustment due to lack of progress  [] Patient is not progressing as expected and requires additional follow up with physician  [] Other:     TREATMENT PLAN     Frequency/Duration: 2x/week for 8 weeks for the following treatment interventions:    Interventions:  Therapeutic Exercise (37793) including: strength training, ROM, and functional mobility  Therapeutic Activities (25950) including: functional mobility training and education.  Neuromuscular Re-education (94940) activation and proprioception, including postural re-education.    Gait Training (62116) for normalization of ambulation patterns and AD training.   Manual Therapy (01276) as indicated to include: Passive Range of Motion, Gr I-IV mobilizations, and Soft Tissue Mobilization  Modalities as needed that may include: Cryotherapy, Electrical Stimulation, Thermal Agents, Vasoneumatic Compression, Traction, and Ultrasound  Patient education on postural re-education, activity modification, and progression of HEP  Aquatic Therapy (60896)     5/21  Plan: Cont POC- Continue emphasis/focus on improving proper muscle recruitment and activation/motor control patterns. Next visit plan to add new exercises     Electronically Signed by Nikko Melendez, PTA  Date: 05/21/2024     Note: If patient does not return for scheduled/recommended follow up visits, this note will serve as a discharge from care along with the most recent update on progress.

## 2024-05-23 ENCOUNTER — HOSPITAL ENCOUNTER (OUTPATIENT)
Dept: PHYSICAL THERAPY | Age: 74
Setting detail: THERAPIES SERIES
Discharge: HOME | End: 2024-05-23
Payer: MEDICARE

## 2024-05-23 PROCEDURE — 97113 AQUATIC THERAPY/EXERCISES: CPT

## 2024-05-23 NOTE — FLOWSHEET NOTE
THERAPY - Therapeutic procedure, 1 or more areas, each 15 minutes. Provided verbal/tactile cueing in aquatic environment for activities related to strengthening, flexibility, endurance, ROM performed to prevent loss of range of motion, maintain or improve muscular strength or increase flexibility, following either an injury or surgery    GOALS     Patient stated goal: \" TO HAVE LESS PAIN  [] Progressing: [] Met: [] Not Met: [] Adjusted    Therapist goals for Patient:   Short Term Goals: To be achieved in: 2 weeks  1. Independent in HEP and progression per patient tolerance, in order to prevent re-injury.   [] Progressing: [] Met: [] Not Met: [] Adjusted  2. Patient will have a decrease in pain by 40%to facilitate improvement in movement, function, and ADLs as indicated by Functional Deficits.  [] Progressing: [] Met: [] Not Met: [] Adjusted    Long Term Goals: To be achieved in: at discharge  1. Disability index score of 60% or less for the WOMAC to assist with reaching prior level of function with activities such as dressing independently.  [] Progressing: [] Met: [] Not Met: [] Adjusted  2. Patient will demonstrate increased AROM of Lspine to min restrictions without pain to allow for proper joint functioning to enable patient to perform bed mobility with ease. .   [] Progressing: [] Met: [] Not Met: [] Adjusted  3. Patient will demonstrate increased Strength of B LEs to at least 4/5 throughout without pain to allow for proper functional mobility to enable patient to return to showering independently.   [] Progressing: [] Met: [] Not Met: [] Adjusted  4. Patient will return to ambulating in home with cane without increased symptoms or restriction to enable patient to carry items from one room to another.   [] Progressing: [] Met: [] Not Met: [] Adjusted  5. Patient will tolerate 40 mins of aquatic exercises without increased symptoms or restriction to enable improved endurance with performing household chores.

## 2024-05-28 ENCOUNTER — HOSPITAL ENCOUNTER (OUTPATIENT)
Dept: PHYSICAL THERAPY | Age: 74
Setting detail: THERAPIES SERIES
Discharge: HOME | End: 2024-05-28
Payer: MEDICARE

## 2024-05-28 PROCEDURE — 97113 AQUATIC THERAPY/EXERCISES: CPT

## 2024-05-28 NOTE — FLOWSHEET NOTE
exercises again starting with only 5-10 reps per exercise.       ASSESSMENT   Assessment:   Deanna Valencia is a 73 y.o. female presenting today to Outpatient PT with signs and symptoms consistent with hypomobility, core/LE weakness, and poor flexibility.    Pt. presents with the functional impairments and activity limitations listed below and would benefit from Outpatient PT to address the below impairments as well as improve pain, and restore function.     Functional Impairments:   Noted lumbar/proximal hip hypomobility  Decreased core/proximal hip strength and neuromuscular control   Decreased LE functional strength   Reduced balance/proprioceptive control     Functional Activity Limitations (from functional questionnaire and intake):  Reduced ability or difficulty with changes of positions or transfers between positions  Reduced ability to maintain good posture and demonstrate good body mechanics with sitting, bending, and lifting  Reduced ability to squat  Reduced ability to ambulate prolonged functional periods/distances/surfaces  Reduced ability to ascend/descend stairs    Participation Restrictions:   Reduced participation in self care  Reduced participation in home management   Reduced participation in social activities    Classification :   Signs/symptoms consistent with post-surgical status including: decreased ROM, strength and function.       Barriers to/and or personal factors that will affect rehab potential:   Co-morbidities    Physical Therapy Evaluation Complexity Justification  [x] A history of present problem and 1-2 personal factors and/or co-morbidities that impact the plan of care  [x] A total of 3 elements found upon examination of body systems using standardized tests and measures addressing any of the following: body structures, functions (impairments), activity limitations, and/or participation restrictions  [x] A clinical presentation with evolving clinical presentation with changing

## 2024-05-31 ENCOUNTER — HOSPITAL ENCOUNTER (OUTPATIENT)
Dept: PHYSICAL THERAPY | Age: 74
Setting detail: THERAPIES SERIES
Discharge: HOME | End: 2024-05-31
Payer: MEDICARE

## 2024-05-31 PROCEDURE — 97530 THERAPEUTIC ACTIVITIES: CPT

## 2024-05-31 PROCEDURE — 97140 MANUAL THERAPY 1/> REGIONS: CPT

## 2024-05-31 NOTE — PLAN OF CARE
glute, piriformis, and lateral hip musculature.  If you are in agreement, please sign and return.      Recommendation:    [x]Continue PT 2x / wk aquatics and 1x/week for land based PT including dry needling for 4-6 weeks.    []Hold PT, pending MD visit        Physical Therapy: TREATMENT/PROGRESS NOTE   Patient: Deanna Valencia (73 y.o. female)   Examination Date: 2024   :  1950 MRN: [id number]   Visit #: 8   Insurance Allowable Auth Needed   KX modifier at visit 15 []Yes    [x]No    Insurance: Payor: MEDICARE / Plan: MEDICARE PART A AND B / Product Type: *No Product type* /   Insurance ID: [id number] - (Medicare)  Secondary Insurance (if applicable): MED[employer]   Treatment Diagnosis:     ICD-10-CM    1. Chronic bilateral low back pain with bilateral sciatica  M54.42     M54.41     G89.29       2. Joint stiffness  M25.60       3. Weakness of both lower extremities  R29.898       4. Impaired mobility and ADLs  Z74.09     Z78.9       5. Gait difficulty  R26.9          Medical Diagnosis:  Arthrodesis status [Z98.1]  Other symptoms and signs involving the musculoskeletal system [R29.898]   Referring Physician: Elias Moore APRN - NP  PCP: Tati Minaya MD     Plan of care signed (Y/N): faxed  (received)    Date of Patient follow up with Physician:       Progress Report/POC: NO  POC update due: (10 visits /OR AUTH LIMITS, whichever is less)  2024                                 SUBJECTIVE EXAMINATION      Test used Initial score  2024   Pain Summary VAS 8-10/10 9/10 B LE  1/10 LBP   Functional questionnaire Modified Oswestry 95/96 = 99% disability  24:  88/96 = 91.6%   Other:              Pain:  Pain location: LBP, down B LEs to the ankle  Patient describes pain to be constant, aching, and throbbing  Pain decreases with: Heat  Pain increases with: Prolonged standing, Prolonged walking, and Prolonged sitting     Relevant Medical History: OA RA, Parkinson's

## 2024-06-04 ENCOUNTER — HOSPITAL ENCOUNTER (OUTPATIENT)
Dept: PHYSICAL THERAPY | Age: 74
Setting detail: THERAPIES SERIES
Discharge: HOME | End: 2024-06-04
Payer: MEDICARE

## 2024-06-04 PROCEDURE — 97113 AQUATIC THERAPY/EXERCISES: CPT

## 2024-06-04 PROCEDURE — 97150 GROUP THERAPEUTIC PROCEDURES: CPT

## 2024-06-04 NOTE — FLOWSHEET NOTE
[facility]- Outpatient Rehabilitation and Therapy [address] [place] [address] office: [phone] fax: [phone]        Physical Therapy: TREATMENT/PROGRESS NOTE   Patient: Deanna Valencia (73 y.o. female)   Examination Date: 2024   :  1950 MRN: [id number]   Visit #: 9   Insurance Allowable Auth Needed   KX modifier at visit 15 []Yes    [x]No    Insurance: Payor: MEDICARE / Plan: MEDICARE PART A AND B / Product Type: *No Product type* /   Insurance ID: [id number] - (Medicare)  Secondary Insurance (if applicable): MED[employer] M[employer]   Treatment Diagnosis:     ICD-10-CM    1. Chronic bilateral low back pain with bilateral sciatica  M54.42     M54.41     G89.29       2. Joint stiffness  M25.60       3. Weakness of both lower extremities  R29.898       4. Impaired mobility and ADLs  Z74.09     Z78.9       5. Gait difficulty  R26.9          Medical Diagnosis:  Arthrodesis status [Z98.1]  Other symptoms and signs involving the musculoskeletal system [R29.898]   Referring Physician: Elias Moore APRN - NP  PCP: Tati Minaya MD     Plan of care signed (Y/N): faxed  (received)    Date of Patient follow up with Physician:       Progress Report/POC: NO  POC update due: (10 visits /OR AUTH LIMITS, whichever is less)  2024                                 SUBJECTIVE EXAMINATION      Test used Initial score  2024   Pain Summary VAS 8-10/10 5-6 mike LE's  5 post ex   Functional questionnaire Modified Oswestry 95/96 = 99% disability  24:  88/96 = 91.6%   Other:              Pain:  Pain location: LBP, down B LEs to the ankle  Patient describes pain to be constant, aching, and throbbing  Pain decreases with: Heat  Pain increases with: Prolonged standing, Prolonged walking, and Prolonged sitting     Relevant Medical History: OA RA, Parkinson's Disease, anxiety, depression, neuropathy    Living status: lives by herself but sister helps out      Patient stated

## 2024-06-06 ENCOUNTER — HOSPITAL ENCOUNTER (OUTPATIENT)
Dept: PHYSICAL THERAPY | Age: 74
Setting detail: THERAPIES SERIES
Discharge: HOME | End: 2024-06-06
Payer: MEDICARE

## 2024-06-06 PROCEDURE — 97113 AQUATIC THERAPY/EXERCISES: CPT

## 2024-06-06 NOTE — FLOWSHEET NOTE
sister.  MD discontinued Amantadine which is used for dyskinesia - As a result when she is fatigued, her dyskinetic movements are much worse. Needed heavy CG today      OBJECTIVE EXAMINATION     Updated measurements 5/31/24:   ROM:  Date Lumbar Flex Lumbar Ext Lumbar L SB Lumbar R SB Lumbar L Rot Lumbar R Rot   Eval 5/8 WNL To neutral Mod decreased Mod decreased Mod decreased Mod decreased   5/31/24 WNL Mod decreased Mod decreased Mod decreased Min decreased Min decreased       Strength:  Date Hip flexion Hip abduction Hip extension Quad Hamstring Ankle DF Ankle PF   Eval 5/8 L=4/5  R=4-/5 3+/5 3-/5 4/5 4/5 4+/5 4+/5   5/31/24 4/5 4-/5 3-/5 4+/5 4+/5 5/5 5/5     Palpation:   Patient reported tenderness with palpation  Location:B glute max, piriformis and glute med    Posture:   forward trunk and minimal trunk rotation with ambulation    Joint Mobility:  Significant hypomobility noted throughout Lspine and lower tspine.  Pt with poor tolerance as well to PA glides.  Reported shooting pain in L LE with Grade 2 unilateral PA glides on L and some N/T into L UE from prone positioning.     Exercises/Interventions   NO LAND SESSION TODAY    Therapeutic Ex (12128)  resistance Sets/time Reps Notes/Cues/Progressions                                             Manual Intervention (94420)  TIME            STM B piriformis, glute med/max  Manual B hip stretching  Manual supine LTR  X25 mins            NMR re-education (90220) resistance Sets/time Reps CUES NEEDED                                      Therapeutic Activity (58846)  Sets/time            Reassessment of functional mobility and strength.  Pt education given on benefits of dry needling as an adjunct therapy to current aquatic/land program  X13 mins                            Aquatic Visits Exercises/Activities:    Aquatic Abbreviation Key  B= Belt DB= Dumbells T= Theratube   G=Gloves N= Noodles W= Weights   P= Paddles WW=Water Walker K= Kickboard        Transfers:

## 2024-06-11 ENCOUNTER — HOSPITAL ENCOUNTER (OUTPATIENT)
Dept: PHYSICAL THERAPY | Age: 74
Setting detail: THERAPIES SERIES
Discharge: HOME | End: 2024-06-11
Payer: MEDICARE

## 2024-06-11 PROCEDURE — 97113 AQUATIC THERAPY/EXERCISES: CPT

## 2024-06-11 NOTE — FLOWSHEET NOTE
G=Gloves N= Noodles W= Weights   P= Paddles WW=Water Walker K= Kickboard        Transfers:         % Immersion:  50-75%            Ambulation:  Laps  Exercises UE:      Forwards 3  Shoulder Shrugs  Lateral 2 Shoulder circles 10 post    Marching    Scapular Retraction 10    Retro  1  Rolling 10    [place]  Push Downs Multifidus walkouts w/paddle  IR/ER 5 each     Punching    Stretchin sec x 2  VC Rowing x   Gastroc/Soleus X Elbow Flex/Ext x   Hamstring X Shldr Flex/Ext    Knee flex stretch   Shldr Abd/Add    Piriformis   Horiz Abd/Add x   Hip flexor    Arm Circles    SKTC   PNF Diagonals    DKTC  UE oscillations f/b, s/s    ITB   Wall Push-ups    Quad  Figure 8's    Mid back   Buoy pull/push downs    UT  Tband rows    Rhom  Tband lats    Post Shoulder  Lumbar Rot w/ paddles    Pec   Small ball pull downs                   diagonals             Cervical:       AROM Flex 10      AROM Extension     LEs exercises:  10 each with VC for tech and slow down. AROM Side Bending    Marching  x AROM Rotation 10   Heel Raises  X   Chin tucks    Toe Raises  X   Chin nods     Squats X      Hamstring Curls  X      Hip Flexion  X Balance:     Hip Abduction X SLS    Hip Circles X    Tandem stance  On step Add again   TA set  NBOS eyes open 30 sec with ball chest pass to wall    Glut Set 10 vc NBOS eyes closed    Hip Extension X   VC Hand to Opposite Knee    Hip Adduction   Box Step     Hip IR   Noodle Stance     Hip ER  Stop/Go Gait    Fig 8's  Switch Gait                Seated: 10 each-No stability needed today. Functional:  10each   Ankle Pumps x Step up forward X   Ankle circles x Step up lateral add   Knee flex & ext x Step down    Hip Abd & Adduction x [place] squats    Bicycle  1 MIN  Crate Lifts    Add Set with ball X  with assist to stabilize ball Lunges forward    LX stab with med ball throws  Lunges lateral    Ankle INV  Lunges retro    Ankle EV  Lower ab curl with noodle      Upper ab curl with ball      Med

## 2024-06-13 ENCOUNTER — HOSPITAL ENCOUNTER (OUTPATIENT)
Dept: PHYSICAL THERAPY | Age: 74
Setting detail: THERAPIES SERIES
Discharge: HOME | End: 2024-06-13
Payer: MEDICARE

## 2024-06-13 PROCEDURE — 97113 AQUATIC THERAPY/EXERCISES: CPT

## 2024-06-13 NOTE — FLOWSHEET NOTE
fusion levels.   Prior to surgery patient was experiencing LBP with B LE pain.  Patient notes she seemed to be improving for awhile with home therapy.  Then patient stopped performing home exercises and pain returned.  Notes she struggles to do even her simple exercises now.  Currently using rollator for ambulation.  Pt needs assistance with dressing, showering at times from sister.  Pt reports difficulty with sitting and standing for prolonged periods.  Most uncomfortable thing to do is to sit for longer than 10 mins.  Patient reports occasional back pain and constant B LE pain down to her ankles.      5/9 -  Pain in both legs is 9/10, down the back of both legs, everything feels tight.  5/14 - Pain 9 1/2 today LLE> RLE, mid shin to foot - painful ankles.  Just woke up - notes she spends a great deal of the day in bed as that is the only place she feels comfortable.    5/16 -  9/10 LE pain today -  increased extraneous movements noted - took Parkinsons meds at 1230  - less balanced today - Use BUOY for walking and keep close guard.  5/21 continues to c/o BLE pain; pt seems a bit impulsive with decreased safety awareness.   5/23: \"It's a 10/10 day for both my legs.\" Pt states she takes pain meds before she arrives.   5/28 -  Pain is 910 today - she likes the pool, feels that she can move better and sometimes it decreases her pain.  5/31: Pt reports that aquatics seems to be helping.  She is able to move and exercise without having increased c/o pain.  Notes feeling better after the pool for a few hours then pain starts to return.  Pt reports 20-25% improvement overall thus far.  Pt notes her balance with walking is more stable since starting PT.  Slightly more endurance throughout the day. Pt with new script from Parkinson's MD for dry needling.   6/4 - Pt just woke up - pain in LE's is 5-6 which is an improvement, not always the case upon waking.    6/6 -  Pt extremely dyskinetic today - spoke with pt and sister.

## 2024-06-14 ENCOUNTER — HOSPITAL ENCOUNTER (OUTPATIENT)
Dept: PHYSICAL THERAPY | Age: 74
Setting detail: THERAPIES SERIES
End: 2024-06-14
Payer: MEDICARE

## 2024-06-18 ENCOUNTER — APPOINTMENT (OUTPATIENT)
Dept: PHYSICAL THERAPY | Age: 74
End: 2024-06-18
Payer: MEDICARE

## 2024-06-19 ENCOUNTER — APPOINTMENT (OUTPATIENT)
Dept: PHYSICAL THERAPY | Age: 74
End: 2024-06-19
Payer: MEDICARE

## 2024-06-25 ENCOUNTER — HOSPITAL ENCOUNTER (OUTPATIENT)
Dept: PHYSICAL THERAPY | Age: 74
Setting detail: THERAPIES SERIES
Discharge: HOME | End: 2024-06-25
Payer: MEDICARE

## 2024-06-25 PROCEDURE — 97113 AQUATIC THERAPY/EXERCISES: CPT

## 2024-06-25 NOTE — FLOWSHEET NOTE
(TIMED) minutes # CPT Code (UNTIMED) #     Therex (63835)     EVAL:MODERATE (51930 - Typically 30 minutes face-to-face)     Neuromusc. Re-ed (09834)    Re-Eval (24356)     Manual (31757)    Estim Unattended (48432)     Ther. Act (90893)    Mech. Traction (39873)     Gait (13874)    Dry Needle 1-2 muscle (78242)     Aquatic Therex (51775) 38 3  Dry Needle 3+ muscle (20561)     Iontophoresis (25202)    VASO (37172)     Ultrasound (64915)    Group Therapy (02773)     Estim Attended (31975)    Canalith Repositioning (37199)     Other:    Other:    Total Timed Code Tx Minutes 38 3       Total Treatment Minutes 38        Charge Justification:  (47842) AQUATIC THERAPY - Therapeutic procedure, 1 or more areas, each 15 minutes. Provided verbal/tactile cueing in aquatic environment for activities related to strengthening, flexibility, endurance, ROM performed to prevent loss of range of motion, maintain or improve muscular strength or increase flexibility, following either an injury or surgery    GOALS     Patient stated goal: \" TO HAVE LESS PAIN  [x] Progressing: [] Met: [] Not Met: [] Adjusted    Therapist goals for Patient:   Short Term Goals: To be achieved in: 2 weeks  1. Independent in HEP and progression per patient tolerance, in order to prevent re-injury.   [] Progressing: [x] Met: [] Not Met: [] Adjusted  2. Patient will have a decrease in pain by 40%to facilitate improvement in movement, function, and ADLs as indicated by Functional Deficits.  [x] Progressing: [] Met: [] Not Met: [] Adjusted    Long Term Goals: To be achieved in: at discharge  1. Disability index score of 60% or less for the WOMAC to assist with reaching prior level of function with activities such as dressing independently.  [x] Progressing: [] Met: [] Not Met: [] Adjusted  2. Patient will demonstrate increased AROM of Lspine to min restrictions without pain to allow for proper joint functioning to enable patient to perform bed mobility with ease.

## 2024-06-27 ENCOUNTER — HOSPITAL ENCOUNTER (OUTPATIENT)
Dept: PHYSICAL THERAPY | Age: 74
Setting detail: THERAPIES SERIES
Discharge: HOME | End: 2024-06-27
Payer: MEDICARE

## 2024-06-27 PROCEDURE — 97113 AQUATIC THERAPY/EXERCISES: CPT

## 2024-06-27 NOTE — FLOWSHEET NOTE
moderate dyskinesia  6/27 - very fatigued today - pain is 8-9 back and legs.      OBJECTIVE EXAMINATION     Updated measurements 5/31/24:   ROM:  Date Lumbar Flex Lumbar Ext Lumbar L SB Lumbar R SB Lumbar L Rot Lumbar R Rot   Eval 5/8 WNL To neutral Mod decreased Mod decreased Mod decreased Mod decreased   5/31/24 WNL Mod decreased Mod decreased Mod decreased Min decreased Min decreased       Strength:  Date Hip flexion Hip abduction Hip extension Quad Hamstring Ankle DF Ankle PF   Eval 5/8 L=4/5  R=4-/5 3+/5 3-/5 4/5 4/5 4+/5 4+/5   5/31/24 4/5 4-/5 3-/5 4+/5 4+/5 5/5 5/5     Palpation:   Patient reported tenderness with palpation  Location:B glute max, piriformis and glute med    Posture:   forward trunk and minimal trunk rotation with ambulation    Joint Mobility:  Significant hypomobility noted throughout Lspine and lower tspine.  Pt with poor tolerance as well to PA glides.  Reported shooting pain in L LE with Grade 2 unilateral PA glides on L and some N/T into L UE from prone positioning.     Exercises/Interventions   NO LAND SESSION TODAY    Therapeutic Ex (26674)  resistance Sets/time Reps Notes/Cues/Progressions                                             Manual Intervention (65071)  TIME            STM B piriformis, glute med/max  Manual B hip stretching  Manual supine LTR  X25 mins            NMR re-education (72505) resistance Sets/time Reps CUES NEEDED                                      Therapeutic Activity (54147)  Sets/time            Reassessment of functional mobility and strength.  Pt education given on benefits of dry needling as an adjunct therapy to current aquatic/land program  X13 mins                            Aquatic Visits Exercises/Activities:    Aquatic Abbreviation Key  B= Belt DB= Dumbells T= Theratube   G=Gloves N= Noodles W= Weights   P= Paddles WW=Water Walker K= Kickboard        Transfers:         % Immersion:  50-75%            Ambulation:  Laps  Exercises UE:      Forwards 4 at

## 2024-06-28 ENCOUNTER — APPOINTMENT (OUTPATIENT)
Dept: PHYSICAL THERAPY | Age: 74
End: 2024-06-28
Payer: MEDICARE

## 2024-07-15 NOTE — PLAN OF CARE
stated complaint: History of tingling in the left arm for the past several months. Lasts several seconds,  occurs 3-4 times per day.  May have to change position to minimize it.   Difficulty turning her head.  Issues with turning her head for a long time.  Unable to lay on left side 2/2 discomfort.         Test used Initial score  7/15/24 07/16/2024   Pain Summary VAS 2-7    Functional questionnaire Neck Disability Index 34    Other:              Pain:  Pain location: neck, arm  Patient describes pain to be tingling and pins and needles  Pain decreases with:  sometimes heat  Pain increases with: Activity and Movement     Relevant Medical History: OA, RA, anxiety, tinnitus, dyskinesia, parksinsons disease,  pinched nerve in neck.      Living status: lives alone    Occupation/School:  Work/School Status: Disabled  Job Duties/Demands: NA    Sport/ Recreation/ Leisure/ Hobbies: unable to perform    Review Of Systems (ROS):  [x] Performed Review of systems (Integumentary, CardioPulmonary, Neurological) by intake and observation. Intake form has been scanned into medical record. Patient has been instructed to contact their primary care physician regarding ROS issues if not already being addressed at this time.    [x] Patient history, allergies, meds reviewed. Medical chart reviewed. See intake form.     Co-morbidities/Complexities (which will affect course of rehabilitation):3  []None        Arthritic conditions  [x] Rheumatoid arthritis (M05.9)  [] Osteoarthritis (M19.91)  [] Gout        Neurological conditions  [] Prior Stroke (I69.30)  [x] Parkinson's (G20)  [] Encephalopathy (G93.40)  [] Multiple Sclerosis (G35)  [] Post-polio (G14)  [] Spinal cord injury (SCI)  [] Traumatic brain injury (TBI)  [] ALS    Gastrointestinal conditions   [] Constipation (K59.00)  [] Chron's/ulcerative colitis    Endocrine conditions   [] Hypothyroid (E03.9)  [] Hyperthyroid [] Hx of COVID    Musculoskeletal conditions  [x] Disc pathology

## 2024-07-16 ENCOUNTER — HOSPITAL ENCOUNTER (OUTPATIENT)
Dept: PHYSICAL THERAPY | Age: 74
Setting detail: THERAPIES SERIES
Discharge: HOME | End: 2024-07-16
Payer: MEDICARE

## 2024-07-16 DIAGNOSIS — R52 PAIN: Primary | ICD-10-CM

## 2024-07-16 DIAGNOSIS — R26.89 DECREASED FUNCTIONAL MOBILITY: ICD-10-CM

## 2024-07-16 DIAGNOSIS — M25.60 STIFFNESS IN JOINT: ICD-10-CM

## 2024-07-16 PROCEDURE — 97140 MANUAL THERAPY 1/> REGIONS: CPT

## 2024-07-16 PROCEDURE — 97161 PT EVAL LOW COMPLEX 20 MIN: CPT

## 2024-07-16 PROCEDURE — 97110 THERAPEUTIC EXERCISES: CPT

## 2024-07-18 ENCOUNTER — HOSPITAL ENCOUNTER (OUTPATIENT)
Dept: PHYSICAL THERAPY | Age: 74
Setting detail: THERAPIES SERIES
Discharge: HOME | End: 2024-07-18
Payer: MEDICARE

## 2024-07-18 PROCEDURE — 97035 APP MDLTY 1+ULTRASOUND EA 15: CPT

## 2024-07-18 PROCEDURE — 97140 MANUAL THERAPY 1/> REGIONS: CPT

## 2024-07-18 PROCEDURE — 97110 THERAPEUTIC EXERCISES: CPT

## 2024-07-18 NOTE — FLOWSHEET NOTE
[facility]- Outpatient Rehabilitation and Therapy [address] [place] [address] office: [phone] fax: [phone]     Physical Therapy Initial Evaluation Certification      Dear Radha Gill APRN*,    We had the pleasure of evaluating the following patient for physical therapy services at [facility] Outpatient Physical Therapy.  A summary of our findings can be found in the initial assessment below.  This includes our plan of care.  If you have any questions or concerns regarding these findings, please do not hesitate to contact me at the office phone number listed above.  Thank you for the referral.     Physician Signature:_______________________________Date:__________________  By signing above (or electronic signature), therapist’s plan is approved by physician       Physical Therapy: TREATMENT/PROGRESS NOTE   Patient: Deanna Valencia (73 y.o. female)   Examination Date: 2024   :  1950 MRN: [id number]   Visit #: 2 - Manual entry  Insurance Allowable Auth Needed   Medicare -  - $1433 []Yes    [x]No    Insurance: Payor: MEDICARE / Plan: MEDICARE PART A AND B / Product Type: *No Product type* /   Insurance ID: [id number] - (Medicare)  Secondary Insurance (if applicable): MED[employer] M[employer]   Treatment Diagnosis:     ICD-10-CM    1. Pain  R52       2. Decreased functional mobility  R26.89       3. Stiffness in joint  M25.60          Medical Diagnosis:  Cervicalgia [M54.2]  Other chronic pain [G89.29]  Pain in left shoulder [M25.512]  Other specified disorders of tendon, left shoulder [M67.814]   Referring Physician: Radha Gill APRN*  PCP: Tati Minaya MD     Plan of care signed (Y/N):     Date of Patient follow up with Physician:      Progress Report/POC: NO  POC update due: (10 visits /OR AUTH LIMITS, whichever is less)  2024                                             Precautions/ Contra-indications:           Latex allergy:

## 2024-07-23 ENCOUNTER — HOSPITAL ENCOUNTER (OUTPATIENT)
Dept: PHYSICAL THERAPY | Age: 74
Setting detail: THERAPIES SERIES
Discharge: HOME | End: 2024-07-23
Payer: MEDICARE

## 2024-07-23 PROCEDURE — 97110 THERAPEUTIC EXERCISES: CPT

## 2024-07-23 PROCEDURE — 97035 APP MDLTY 1+ULTRASOUND EA 15: CPT

## 2024-07-23 PROCEDURE — 97140 MANUAL THERAPY 1/> REGIONS: CPT

## 2024-07-23 NOTE — FLOWSHEET NOTE
[facility]- [facility] Therapy [address] [place] [address] office: [phone] fax: [phone]     Physical Therapy Initial Evaluation Certification      Dear Radha Gill APRN*,    We had the pleasure of evaluating the following patient for physical therapy services at [facility].  A summary of our findings can be found in the initial assessment below.  This includes our plan of care.  If you have any questions or concerns regarding these findings, please do not hesitate to contact me at the office phone number listed above.  Thank you for the referral.     Physician Signature:_______________________________Date:__________________  By signing above (or electronic signature), therapist’s plan is approved by physician       Physical Therapy: TREATMENT/PROGRESS NOTE   Patient: Deanna Valencia (73 y.o. female)   Examination Date: 2024   :  1950 MRN: [id number]   Visit #:       3  - Manual entry    Insurance Allowable Auth Needed   Medicare   $[id number]   []Yes    [x]No    Insurance: Payor: MEDICARE / Plan: MEDICARE PART A AND B / Product Type: *No Product type* /   Insurance ID: [id number] - (Medicare)  Secondary Insurance (if applicable): [employer]   Treatment Diagnosis:     ICD-10-CM    1. Pain  R52       2. Decreased functional mobility  R26.89       3. Stiffness in joint  M25.60          Medical Diagnosis:  Cervicalgia [M54.2]  Other chronic pain [G89.29]  Pain in left shoulder [M25.512]  Other specified disorders of tendon, left shoulder [M67.814]   Referring Physician: Radha Gill APRN*  PCP: Tati Minaya MD     Plan of care signed (Y/N):     Date of Patient follow up with Physician:      Progress Report/POC: NO  POC update due: (10 visits /OR AUTH LIMITS, whichever is less)  2024                                             Precautions/ Contra-indications:           Latex allergy: 
reported tenderness with palpation  Location:german UT, CPVM    Posture:   Constant dyskinesis, unable to  stay still at any time  Gait:    Pattern:  marked dyskinesis with gait   Assistive Device Used: Rollator    Special Tests:  [] None Assessed   [] Following tests noted:    NT    Balance:  [x] WNL      [] NT       [] Dysfunction noted  Comment:     Falls Risk Assessment (30 days):   Falls Risk assessed and no intervention required.  Time Up and Go (TUG):   Not Assessed        Exercises/Interventions     Therapeutic Ex (34561)  resistance Sets/time Reps Notes/Cues/Progressions          Supine  Nods  Cervical rotation Right     5 sec  5 sec   5  5     VC   Right UT strength supine  5 sec 5           Scap retraction   5    Shoulder shrugs   5           UT stretch  5 sec 3 R/L           Seated HSS  1 R/L 2 min    seated HR/TR add             Tband row  Tband lats add             Manual Intervention (29787)  TIME            STM German CPVM, UT  12     Gentle supine manual traction                     NMR re-education (25652) resistance Sets/time Reps CUES NEEDED                                      Therapeutic Activity (35989)  Sets/time                                          Modalities:    Ultrasound: Applied to Cervical Spine for 8 minutes. Parameters: 1.5 w/cm2    Hot Pack supine     Education/Home Exercise Program: HEP discussed and performed, see exercise grid        ASSESSMENT   Assessment:   Deanna Valencia is a 73 y.o. female presenting today to Outpatient PT with signs and symptoms consistent with cervical radiculopathy, myofascial pain .    Pt. presents with the functional impairments and activity limitations listed below and would benefit from Outpatient PT to address the below impairments as well as improve pain, and restore function.     Today's Assessment: Patient had good tolerance to today's session, reporting reduced pain with program completed. Able to progress  exercise difficulty on cervical

## 2024-07-25 ENCOUNTER — HOSPITAL ENCOUNTER (OUTPATIENT)
Dept: PHYSICAL THERAPY | Age: 74
Setting detail: THERAPIES SERIES
End: 2024-07-25
Payer: MEDICARE

## 2024-07-30 ENCOUNTER — HOSPITAL ENCOUNTER (OUTPATIENT)
Dept: PHYSICAL THERAPY | Age: 74
Setting detail: THERAPIES SERIES
Discharge: HOME | End: 2024-07-30
Payer: MEDICARE

## 2024-07-30 PROCEDURE — 97035 APP MDLTY 1+ULTRASOUND EA 15: CPT

## 2024-07-30 PROCEDURE — 97110 THERAPEUTIC EXERCISES: CPT

## 2024-07-30 PROCEDURE — 97140 MANUAL THERAPY 1/> REGIONS: CPT

## 2024-07-30 NOTE — FLOWSHEET NOTE
towards functional goals listed.    [] Progression is slowed due to complexities/Impairments listed.  [] Progression has been slowed due to co-morbidities.  [x] Plan just implemented, too soon (<30days) to assess goals progression   [] Goals require adjustment due to lack of progress  [] Patient is not progressing as expected and requires additional follow up with physician  [] Other:     TREATMENT PLAN     Frequency/Duration: 2x/week for 8 weeks for the following treatment interventions:    Interventions:  Therapeutic Exercise (94207) including: strength training, ROM, and functional mobility  Therapeutic Activities (01809) including: functional mobility training and education.  Neuromuscular Re-education (19749) activation and proprioception, including postural re-education.    Manual Therapy (96144) as indicated to include: Soft Tissue Mobilization  Modalities as needed that may include: Thermal Agents    Plan: Cont POC- Continue emphasis/focus on exercise progression and modulating pain. Next visit plan to continue with ROM ex, progress as raven     Electronically Signed by Cecilia Mulligan, PT  Date: 07/30/2024     Note: Portions of this note have been templated and/or copied from initial evaluation, reassessments and prior notes for documentation efficiency.    Note: If patient does not return for scheduled/recommended follow up visits, this note will serve as a discharge from care along with the most recent update on progress.    Ortho Evaluation

## 2024-08-01 ENCOUNTER — HOSPITAL ENCOUNTER (OUTPATIENT)
Dept: PHYSICAL THERAPY | Age: 74
Setting detail: THERAPIES SERIES
Discharge: HOME | End: 2024-08-01
Payer: MEDICARE

## 2024-08-01 PROCEDURE — 97140 MANUAL THERAPY 1/> REGIONS: CPT

## 2024-08-01 PROCEDURE — 97110 THERAPEUTIC EXERCISES: CPT

## 2024-08-01 PROCEDURE — 97035 APP MDLTY 1+ULTRASOUND EA 15: CPT

## 2024-08-01 NOTE — FLOWSHEET NOTE
[facility]- Outpatient Rehabilitation and Therapy [address] [place] [address] office: [phone] fax: [phone]     Physical Therapy Initial Evaluation Certification      Dear Radha Gill APRN*,    We had the pleasure of evaluating the following patient for physical therapy services at [facility].  A summary of our findings can be found in the initial assessment below.  This includes our plan of care.  If you have any questions or concerns regarding these findings, please do not hesitate to contact me at the office phone number listed above.  Thank you for the referral.     Physician Signature:_______________________________Date:__________________  By signing above (or electronic signature), therapist’s plan is approved by physician       Physical Therapy: TREATMENT/PROGRESS NOTE   Patient: Deanna Valencia (73 y.o. female)   Examination Date: 2024   :  1950 MRN: [id number]   Visit #:      5 - Manual entry      Insurance Allowable Auth Needed   Medicare   $1631.95    []Yes    [x]No    Insurance: Payor: MEDICARE / Plan: MEDICARE PART A AND B / Product Type: *No Product type* /   Insurance ID: [id number] - (Medicare)  Secondary Insurance (if applicable): [employer]   Treatment Diagnosis:     ICD-10-CM    1. Pain  R52       2. Decreased functional mobility  R26.89       3. Stiffness in joint  M25.60          Medical Diagnosis:  Cervicalgia [M54.2]  Other chronic pain [G89.29]  Pain in left shoulder [M25.512]  Other specified disorders of tendon, left shoulder [M67.814]   Referring Physician: Radha Gill APRN*  PCP: Tati Minaya MD     Plan of care signed (Y/N):     Date of Patient follow up with Physician:      Progress Report/POC: NO  POC update due: (10 visits /OR AUTH LIMITS, whichever is less)  2024                                             Precautions/ Contra-indications:           Latex

## 2024-08-06 ENCOUNTER — HOSPITAL ENCOUNTER (OUTPATIENT)
Dept: PHYSICAL THERAPY | Age: 74
Setting detail: THERAPIES SERIES
Discharge: HOME | End: 2024-08-06
Payer: MEDICARE

## 2024-08-06 PROCEDURE — 97140 MANUAL THERAPY 1/> REGIONS: CPT

## 2024-08-06 PROCEDURE — 97110 THERAPEUTIC EXERCISES: CPT

## 2024-08-06 PROCEDURE — 97035 APP MDLTY 1+ULTRASOUND EA 15: CPT

## 2024-08-06 NOTE — FLOWSHEET NOTE
CERVICAL Cerv flexion       Cerv extension       Cerv SB       Cerv rotation          SHOULDER Flexion Grossly 4-/5 all Grossly 4-/5 all     Abduction       ER -0       ER -90       IR -0       IR -90        ELBOW Flex/biceps       Ext/triceps       Pronation       supination          WRIST Flexion       Extension       RD       UD                 Palpation:   Patient reported tenderness with palpation  Location:german UT, CPVM    Posture:   Constant dyskinesis, unable to  stay still at any time  Gait:    Pattern:  marked dyskinesis with gait   Assistive Device Used: Rollator    Special Tests:  [] None Assessed   [] Following tests noted:    NT    Balance:  [x] WNL      [] NT       [] Dysfunction noted  Comment:     Falls Risk Assessment (30 days):   Falls Risk assessed and no intervention required.  Time Up and Go (TUG):   Not Assessed        Exercises/Interventions     Therapeutic Ex (80403)  resistance Sets/time Reps Notes/Cues/Progressions          Supine  Nods  Cervical rotation Right     5 sec  5 sec   5  5     VC   Right UT stretch  supine  5 sec 5           Scap retraction  red TB   5 Given for home use   Shoulder shrugs   10           RUT stretch  5 sec 5           Seated HSS  1 R/L 2 min    Seated HR/TR   10 german           Manual Intervention (52676)  TIME            STM German CPVM, UT  13 min     Gentle supine manual traction  2 min                   NMR re-education (41076) resistance Sets/time Reps CUES NEEDED          Therapeutic Activity (10364)  Sets/time                     Modalities:    Ultrasound: Applied to Cervical Spine for 8 minutes. Parameters: 1.5 w/cm2    Hot Pack supine     Education/Home Exercise Program: HEP discussed and performed, see exercise grid        ASSESSMENT   Assessment:   Deanna Valencia is a 74 y.o. female presenting today to Outpatient PT with signs and symptoms consistent with cervical radiculopathy, myofascial pain .    Pt. presents with the functional impairments

## 2024-08-08 ENCOUNTER — HOSPITAL ENCOUNTER (OUTPATIENT)
Dept: PHYSICAL THERAPY | Age: 74
Setting detail: THERAPIES SERIES
End: 2024-08-08
Payer: MEDICARE

## 2024-08-13 ENCOUNTER — HOSPITAL ENCOUNTER (OUTPATIENT)
Dept: PHYSICAL THERAPY | Age: 74
Setting detail: THERAPIES SERIES
Discharge: HOME | End: 2024-08-13
Payer: MEDICARE

## 2024-08-13 PROCEDURE — 97035 APP MDLTY 1+ULTRASOUND EA 15: CPT

## 2024-08-13 PROCEDURE — 97110 THERAPEUTIC EXERCISES: CPT

## 2024-08-13 PROCEDURE — 97140 MANUAL THERAPY 1/> REGIONS: CPT

## 2024-08-13 NOTE — FLOWSHEET NOTE
[facility]- Outpatient Rehabilitation and Therapy [address] [place] [address] office: [phone] fax: [phone]     Physical Therapy Initial Evaluation Certification      Dear Radha Gill APRN*,    We had the pleasure of evaluating the following patient for physical therapy services at [facility].  A summary of our findings can be found in the initial assessment below.  This includes our plan of care.  If you have any questions or concerns regarding these findings, please do not hesitate to contact me at the office phone number listed above.  Thank you for the referral.     Physician Signature:_______________________________Date:__________________  By signing above (or electronic signature), therapist’s plan is approved by physician       Physical Therapy: TREATMENT/PROGRESS NOTE   Patient: Deanna Valencia (74 y.o. female)   Examination Date: 2024   :  1950 MRN: [id number]   Visit #:   7 - Manual entry     Insurance Allowable Auth Needed   Medicare   $1764 -   []Yes    [x]No    Insurance: Payor: MEDICARE / Plan: MEDICARE PART A AND B / Product Type: *No Product type* /   Insurance ID: [id number] - (Medicare)  Secondary Insurance (if applicable): [employer]   Treatment Diagnosis:     ICD-10-CM    1. Pain  R52       2. Decreased functional mobility  R26.89       3. Stiffness in joint  M25.60          Medical Diagnosis:  Cervicalgia [M54.2]  Other chronic pain [G89.29]  Pain in left shoulder [M25.512]  Other specified disorders of tendon, left shoulder [M67.814]   Referring Physician: Radha Gill APRN*  PCP: Tati Minaya MD     Plan of care signed (Y/N): yes    Date of Patient follow up with Physician:      Progress Report/POC: NO  POC update due: (10 visits /OR AUTH LIMITS, whichever is less)  2024                                             Precautions/ Contra-indications:           Latex allergy:

## 2024-08-15 ENCOUNTER — TELEPHONE (OUTPATIENT)
Dept: PHYSICAL THERAPY | Age: 74
End: 2024-08-15

## 2024-08-15 ENCOUNTER — APPOINTMENT (OUTPATIENT)
Dept: PHYSICAL THERAPY | Age: 74
End: 2024-08-15
Payer: MEDICARE

## 2024-08-15 NOTE — TELEPHONE ENCOUNTER
Called referring provider's office to request review, sign and return of outpatient rehabilitation plan of care. Spoke with office staff to confirm fax number and clarify importance of signing fax.     Have not yet received..   Comments (if applicable):

## 2024-08-16 ENCOUNTER — APPOINTMENT (OUTPATIENT)
Dept: PHYSICAL THERAPY | Age: 74
End: 2024-08-16
Payer: MEDICARE

## 2024-08-20 ENCOUNTER — HOSPITAL ENCOUNTER (OUTPATIENT)
Dept: PHYSICAL THERAPY | Age: 74
Setting detail: THERAPIES SERIES
Discharge: HOME | End: 2024-08-20
Payer: MEDICARE

## 2024-08-20 PROCEDURE — 97110 THERAPEUTIC EXERCISES: CPT

## 2024-08-20 PROCEDURE — 97035 APP MDLTY 1+ULTRASOUND EA 15: CPT

## 2024-08-20 PROCEDURE — 97140 MANUAL THERAPY 1/> REGIONS: CPT

## 2024-08-20 NOTE — FLOWSHEET NOTE
Physical Therapy Re-Certification Plan of Care/MD UPDATE      Dear Radha Gill APRN*,    We had the pleasure of treating the following patient for physical therapy services at [facility].  A summary of our findings can be found in the updated assessment below.  This includes our plan of care.  If you have any questions or concerns regarding these findings, please do not hesitate to contact me at the office phone number checked above.  Thank you for the referral.     Physician Signature:________________________________Date:__________________  By signing above (or electronic signature), therapist’s plan is approved by physician    Date Range Of Visits: 7/16 - 8/20/24  Total Visits to Date: 8  Overall Response to Treatment:   Pt demonstrates improved ROM and functional mobility.  Generally has less cervical pain and tightness, but this depends on dyskinesia and sleep as well.  Pt is indep with HEP and notes that she does better when she uses heat and does her HEP.    Plan -  DC OPPT         [facility]- Outpatient Rehabilitation and Therapy [address] [place] [address] office: [phone] fax: [phone]     Physical Therapy Initial Evaluation Certification      Dear Radha Gill APRN*,    We had the pleasure of evaluating the following patient for physical therapy services at [facility].  A summary of our findings can be found in the initial assessment below.  This includes our plan of care.  If you have any questions or concerns regarding these findings, please do not hesitate to contact me at the office phone number listed above.  Thank you for the referral.     Physician Signature:_______________________________Date:__________________  By signing above (or electronic signature), therapist’s plan is approved by physician       Physical Therapy: TREATMENT/PROGRESS NOTE   Patient: Deanna Valencia (74 y.o. female)

## 2024-08-22 ENCOUNTER — APPOINTMENT (OUTPATIENT)
Dept: PHYSICAL THERAPY | Age: 74
End: 2024-08-22
Payer: MEDICARE

## (undated) DEVICE — SOLUTION IRRIGATION BAL SALT SOLUTION 15 ML STRL BSS

## (undated) DEVICE — PACK PHACO 0.9MM 45DEG BAL FMS IRR ASPIR CENTURION

## (undated) DEVICE — COVER,MAYO STAND,STERILE: Brand: MEDLINE

## (undated) DEVICE — Device

## (undated) DEVICE — GLOVE SURG SZ 7.5 L11.73IN FNGR THK9.8MIL STRW LTX POLYMER

## (undated) DEVICE — SOLUTION IRRIG BSS ST 500ML

## (undated) DEVICE — SOLUTION IV IRRIG WATER 500ML POUR BRL ST 2F7113

## (undated) DEVICE — Z DISCONTINUED USE 2131664 WIPE INSTR W3XL3IN NONLINTING

## (undated) DEVICE — STOPCOCK IV PRIMING 0.26ML 3 W W/ TWO FEM LUERLOK PRT 1

## (undated) DEVICE — MICROSURGICAL INSTRUMENT "J" SHAPED CANNULA 27GA: Brand: ALCON

## (undated) DEVICE — SYRINGE 30CC LUER LOCK: Brand: CARDINAL HEALTH